# Patient Record
Sex: FEMALE | Race: WHITE | NOT HISPANIC OR LATINO | Employment: FULL TIME | URBAN - METROPOLITAN AREA
[De-identification: names, ages, dates, MRNs, and addresses within clinical notes are randomized per-mention and may not be internally consistent; named-entity substitution may affect disease eponyms.]

---

## 2017-11-09 ENCOUNTER — OFFICE VISIT (OUTPATIENT)
Dept: URGENT CARE | Facility: CLINIC | Age: 22
End: 2017-11-09
Payer: COMMERCIAL

## 2017-11-09 VITALS
RESPIRATION RATE: 16 BRPM | TEMPERATURE: 97.4 F | OXYGEN SATURATION: 98 % | SYSTOLIC BLOOD PRESSURE: 104 MMHG | HEIGHT: 70 IN | BODY MASS INDEX: 24.34 KG/M2 | WEIGHT: 170 LBS | DIASTOLIC BLOOD PRESSURE: 70 MMHG | HEART RATE: 68 BPM

## 2017-11-09 DIAGNOSIS — K29.00 ACUTE SUPERFICIAL GASTRITIS WITHOUT HEMORRHAGE: ICD-10-CM

## 2017-11-09 DIAGNOSIS — K52.9 GASTROENTERITIS: ICD-10-CM

## 2017-11-09 PROCEDURE — 99213 OFFICE O/P EST LOW 20 MIN: CPT | Performed by: NURSE PRACTITIONER

## 2017-11-09 RX ORDER — ONDANSETRON 4 MG/1
4 TABLET, FILM COATED ORAL EVERY 4 HOURS PRN
Qty: 20 TAB | Refills: 0 | Status: SHIPPED | OUTPATIENT
Start: 2017-11-09 | End: 2022-01-20

## 2017-11-09 RX ORDER — ONDANSETRON 4 MG/1
4 TABLET, ORALLY DISINTEGRATING ORAL ONCE
Status: COMPLETED | OUTPATIENT
Start: 2017-11-09 | End: 2017-11-09

## 2017-11-09 RX ADMIN — ONDANSETRON 4 MG: 4 TABLET, ORALLY DISINTEGRATING ORAL at 12:22

## 2017-11-09 ASSESSMENT — ENCOUNTER SYMPTOMS
DIZZINESS: 0
CHILLS: 0
ABDOMINAL PAIN: 1
PALPITATIONS: 0
DIARRHEA: 1
MYALGIAS: 0
SORE THROAT: 0
FEVER: 0
SHORTNESS OF BREATH: 0
VOMITING: 1
COUGH: 0
HEADACHES: 0
NAUSEA: 1

## 2017-11-09 NOTE — LETTER
November 9, 2017       Patient: Lottie Melgoza   YOB: 1995   Date of Visit: 11/9/2017         To Whom It May Concern:    It is my medical opinion that Lottie Melgoza remain out of work until 11/11/2017.    If you have any questions or concerns, please don't hesitate to call [phone]          Sincerely,          SHANE Singh.P.N.  Electronically Signed

## 2017-11-09 NOTE — PROGRESS NOTES
"Subjective:      Lottie Melgoza is a 22 y.o. female who presents with Emesis (x this am, vomiting and diarreha)    Chief Complaint   Patient presents with   • Emesis     x this am, vomiting and diarreha     Started this a.m. She has been having vomiting diarrhea and chills. 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 She did have some lower right abdominal pain but that has resolved after going to the bathroom. She denies any chance of pregnancy and states she is currently on her menstrual cycle            HPI    Review of Systems   Constitutional: Negative for chills, fever and malaise/fatigue.   HENT: Negative for congestion and sore throat.    Respiratory: Negative for cough and shortness of breath.    Cardiovascular: Negative for chest pain and palpitations.   Gastrointestinal: Positive for abdominal pain, diarrhea, nausea and vomiting.   Genitourinary: Negative for dysuria.   Musculoskeletal: Negative for myalgias.   Skin: Negative for rash.   Neurological: Negative for dizziness and headaches.     /  Past Medical History:   Diagnosis Date   • Celiac disease      Family history reviewed and not related to this visit  Social History     Social History   • Marital status: Single     Spouse name: N/A   • Number of children: N/A   • Years of education: N/A     Occupational History   • Not on file.     Social History Main Topics   • Smoking status: Never Smoker   • Smokeless tobacco: Not on file   • Alcohol use Yes      Comment: monthly   • Drug use: No   • Sexual activity: Not on file     Other Topics Concern   • Not on file     Social History Narrative   • No narrative on file          Objective:     /70   Pulse 68   Temp 36.3 °C (97.4 °F)   Resp 16   Ht 1.777 m (5' 9.96\")   Wt 77.1 kg (170 lb)   SpO2 98%   BMI 24.42 kg/m²      Physical Exam   Constitutional: She is oriented to person, place, and time. She appears well-developed and well-nourished. No distress.   HENT:   Head: " Normocephalic.   Cardiovascular: Normal rate.    Pulmonary/Chest: Effort normal.   Abdominal: Soft. She exhibits no distension. Bowel sounds are increased. There is tenderness (generalized,). There is no guarding.   Musculoskeletal: Normal range of motion.   Neurological: She is alert and oriented to person, place, and time.   Skin: Skin is warm and dry.   Psychiatric: She has a normal mood and affect. Her behavior is normal. Judgment and thought content normal.   Nursing note and vitals reviewed.              Assessment/Plan:     1. Acute superficial gastritis without hemorrhage    - ondansetron (ZOFRAN ODT) dispertab 4 mg; Take 1 Tab by mouth Once.    2. Gastroenteritis     - ondansetron (ZOFRAN) 4 MG Tab tablet; Take 1 Tab by mouth every four hours as needed for Nausea/Vomiting.  Dispense: 20 Tab; Refill: 0    Clear liquids  Advance as tolerated  Work note  Patient is aware of when to go to emergency room for treatment of her stomach condition and states verbal understanding of this      Please make an appointment for follow up with your primary care provider or make appointment to establish with a primary care. Return for any perception of change in condition, worsening of symptoms, such as perception of worse pain, worsening fever, not getting better, or any other concerns. Please go to the nearest emergency room for any shortness of breath or chest pain or for any of the emergent conditions we have discussed. Patient and/or family states understanding to plan of care, and discharge instructions. Different diagnosis were discussed and signs/symptoms were discussed to educate on.

## 2018-01-25 ENCOUNTER — OFFICE VISIT (OUTPATIENT)
Dept: URGENT CARE | Facility: CLINIC | Age: 23
End: 2018-01-25
Payer: COMMERCIAL

## 2018-01-25 VITALS
WEIGHT: 165.4 LBS | TEMPERATURE: 98 F | OXYGEN SATURATION: 98 % | RESPIRATION RATE: 16 BRPM | HEART RATE: 84 BPM | BODY MASS INDEX: 23.68 KG/M2 | DIASTOLIC BLOOD PRESSURE: 70 MMHG | SYSTOLIC BLOOD PRESSURE: 110 MMHG | HEIGHT: 70 IN

## 2018-01-25 DIAGNOSIS — J06.9 VIRAL URI WITH COUGH: ICD-10-CM

## 2018-01-25 PROCEDURE — 99214 OFFICE O/P EST MOD 30 MIN: CPT | Performed by: PHYSICIAN ASSISTANT

## 2018-01-25 RX ORDER — CODEINE PHOSPHATE AND GUAIFENESIN 10; 100 MG/5ML; MG/5ML
5 SOLUTION ORAL EVERY 4 HOURS PRN
Qty: 100 ML | Refills: 0 | Status: SHIPPED | OUTPATIENT
Start: 2018-01-25 | End: 2018-01-30

## 2018-01-25 ASSESSMENT — ENCOUNTER SYMPTOMS
COUGH: 1
FEVER: 1
DIZZINESS: 0
HEADACHES: 1
SHORTNESS OF BREATH: 0
SORE THROAT: 1
WHEEZING: 0
SINUS PAIN: 0
CHILLS: 0
CARDIOVASCULAR NEGATIVE: 1
MUSCULOSKELETAL NEGATIVE: 1
MYALGIAS: 0
GASTROINTESTINAL NEGATIVE: 1

## 2018-01-25 ASSESSMENT — PATIENT HEALTH QUESTIONNAIRE - PHQ9: CLINICAL INTERPRETATION OF PHQ2 SCORE: 0

## 2018-01-25 NOTE — LETTER
January 25, 2018         Patient: Lottie Melgoza   YOB: 1995   Date of Visit: 1/25/2018           To Whom it May Concern:    Lottie Melgoza was seen in my clinic on 1/25/2018. She may return to work on Sunday. Jan. 28th.    If you have any questions or concerns, please don't hesitate to call.        Sincerely,           Srinivas Alamo P.A.-C.  Electronically Signed

## 2018-01-25 NOTE — PROGRESS NOTES
Subjective:      Lottie Melgoza is a 22 y.o. female who presents with Cough (fever, bodyaches, sore throat, x 4 days, feeling better today and needs work note )            Cough   This is a new problem. The current episode started in the past 7 days. The problem has been gradually improving. The problem occurs every few minutes. The cough is productive of sputum. Associated symptoms include a fever (resolved), headaches, nasal congestion and a sore throat. Pertinent negatives include no chills, ear pain, myalgias, shortness of breath or wheezing. She has tried OTC cough suppressant for the symptoms. The treatment provided mild relief. There is no history of asthma, bronchitis or pneumonia.       PMH:  has a past medical history of Celiac disease.  MEDS:   Current Outpatient Prescriptions:   •  ondansetron (ZOFRAN) 4 MG Tab tablet, Take 1 Tab by mouth every four hours as needed for Nausea/Vomiting., Disp: 20 Tab, Rfl: 0  •  Hydrocod Polst-CPM Polst ER (TUSSIONEX) 10-8 MG/5ML Suspension Extended Release, Take 5 mL by mouth every 12 hours., Disp: 140 mL, Rfl: 0  •  MethylPREDNISolone (MEDROL DOSEPAK) 4 MG Tablet Therapy Pack, Take as directed., Disp: 21 Tab, Rfl: 0  •  albuterol 108 (90 BASE) MCG/ACT Aero Soln inhalation aerosol, Inhale 2 Puffs by mouth every four hours as needed for Shortness of Breath., Disp: 1 Inhaler, Rfl: 0  •  ondansetron (ZOFRAN ODT) 4 MG TABLET DISPERSIBLE, Take 1 Tab by mouth every 8 hours as needed for Nausea/Vomiting., Disp: 12 Tab, Rfl: 0  ALLERGIES: No Known Allergies  SURGHX: History reviewed. No pertinent surgical history.  SOCHX:  reports that she has never smoked. She has never used smokeless tobacco. She reports that she drinks alcohol. She reports that she does not use drugs.  FH: family history is not on file.      Review of Systems   Constitutional: Positive for fever (resolved). Negative for chills.   HENT: Positive for congestion and sore throat. Negative for ear pain and sinus  "pain.    Respiratory: Positive for cough. Negative for shortness of breath and wheezing.    Cardiovascular: Negative.    Gastrointestinal: Negative.    Musculoskeletal: Negative.  Negative for myalgias.   Neurological: Positive for headaches. Negative for dizziness.       Medications, Allergies, and current problem list reviewed today in Epic     Objective:     /70   Pulse 84   Temp 36.7 °C (98 °F)   Resp 16   Ht 1.777 m (5' 9.96\")   Wt 75 kg (165 lb 6.4 oz)   LMP 01/14/2018   SpO2 98%   Breastfeeding? No   BMI 23.76 kg/m²      Physical Exam   Constitutional: She is oriented to person, place, and time. She appears well-developed and well-nourished. No distress.   HENT:   Head: Normocephalic and atraumatic.   Right Ear: Tympanic membrane and external ear normal.   Left Ear: Tympanic membrane and external ear normal.   Nose: Nose normal.   Mouth/Throat: Oropharynx is clear and moist. No oropharyngeal exudate.   Eyes: Conjunctivae and EOM are normal. Pupils are equal, round, and reactive to light. Right eye exhibits no discharge. Left eye exhibits no discharge.   Neck: Normal range of motion. Neck supple.   Cardiovascular: Normal rate, regular rhythm and normal heart sounds.    Pulmonary/Chest: Effort normal and breath sounds normal. No respiratory distress. She has no wheezes. She has no rales.   Musculoskeletal: Normal range of motion.   Lymphadenopathy:     She has no cervical adenopathy.   Neurological: She is alert and oriented to person, place, and time.   Skin: Skin is warm and dry. She is not diaphoretic.   Psychiatric: She has a normal mood and affect. Her behavior is normal. Judgment and thought content normal.   Nursing note and vitals reviewed.              Assessment/Plan:     1. Viral URI with cough  guaifenesin-codeine (ROBITUSSIN AC) Solution oral solution     PO2 adequate, vital signs normal, exam unremarkable, lungs clear bilaterally. No signs of bacterial infection at this time. Likely " a viral upper respiratory infection. Antibiotics are indicated.  Cough medicine at night   [facility] Aware web site evaluation: I have obtained and reviewed patient utilization report from [facility] database prior to writing prescription for controlled substance II, III or IV. Based on the report and my clinical assessment the prescription is medically necessary.   Patient is cautioned on sedation potential of narcotic medication; no drinking, driving or operating heavy machinery while on this medication.  OTC meds and conservative measures as discussed  Note for work  Return to clinic or go to ED if symptoms worsen or persist. Indications for ED discussed at length. Patient voices understanding. Follow-up with your primary care provider in 3-5 days. Red flags discussed.    Please note that this dictation was created using voice recognition software. I have made every reasonable attempt to correct obvious errors, but I expect that there are errors of grammar and possibly content that I did not discover before finalizing the note.

## 2019-01-05 ENCOUNTER — OFFICE VISIT (OUTPATIENT)
Dept: URGENT CARE | Facility: CLINIC | Age: 24
End: 2019-01-05
Payer: COMMERCIAL

## 2019-01-05 VITALS
OXYGEN SATURATION: 95 % | HEART RATE: 88 BPM | SYSTOLIC BLOOD PRESSURE: 118 MMHG | RESPIRATION RATE: 16 BRPM | BODY MASS INDEX: 25.77 KG/M2 | TEMPERATURE: 98.3 F | WEIGHT: 180 LBS | DIASTOLIC BLOOD PRESSURE: 68 MMHG | HEIGHT: 70 IN

## 2019-01-05 DIAGNOSIS — R05.9 COUGH: ICD-10-CM

## 2019-01-05 DIAGNOSIS — J03.90 TONSILLITIS: ICD-10-CM

## 2019-01-05 DIAGNOSIS — R68.89 FLU-LIKE SYMPTOMS: ICD-10-CM

## 2019-01-05 PROCEDURE — 99214 OFFICE O/P EST MOD 30 MIN: CPT | Performed by: PHYSICIAN ASSISTANT

## 2019-01-05 RX ORDER — AZITHROMYCIN 500 MG/1
500 TABLET, FILM COATED ORAL DAILY
Qty: 7 TAB | Refills: 0 | Status: SHIPPED | OUTPATIENT
Start: 2019-01-05 | End: 2019-01-12

## 2019-01-05 RX ORDER — ONDANSETRON 8 MG/1
4-8 TABLET, ORALLY DISINTEGRATING ORAL 3 TIMES DAILY PRN
Qty: 12 TAB | Refills: 0 | Status: SHIPPED | OUTPATIENT
Start: 2019-01-05 | End: 2022-01-20

## 2019-01-05 RX ORDER — PROMETHAZINE HYDROCHLORIDE AND CODEINE PHOSPHATE 6.25; 1 MG/5ML; MG/5ML
5-10 SYRUP ORAL 3 TIMES DAILY PRN
Qty: 150 ML | Refills: 0 | Status: SHIPPED | OUTPATIENT
Start: 2019-01-05 | End: 2019-01-10

## 2019-01-05 RX ORDER — OSELTAMIVIR PHOSPHATE 75 MG/1
75 CAPSULE ORAL 2 TIMES DAILY
Qty: 10 CAP | Refills: 0 | Status: SHIPPED | OUTPATIENT
Start: 2019-01-05 | End: 2019-01-10

## 2019-01-05 ASSESSMENT — ENCOUNTER SYMPTOMS
FEVER: 0
SHORTNESS OF BREATH: 0
CONSTITUTIONAL NEGATIVE: 1
TROUBLE SWALLOWING: 1
CARDIOVASCULAR NEGATIVE: 1
SORE THROAT: 1
SWOLLEN GLANDS: 1
EYES NEGATIVE: 1
MYALGIAS: 1
WHEEZING: 0
COUGH: 1

## 2019-01-05 NOTE — PROGRESS NOTES
"Subjective:      Lottie Melgoza is a 23 y.o. female who presents with Pharyngitis (sore throat, cough x 2 days)            Pharyngitis    This is a new (cough, st) problem. The current episode started yesterday. The problem has been unchanged. Neither side of throat is experiencing more pain than the other. There has been no fever. The pain is moderate. Associated symptoms include coughing, swollen glands and trouble swallowing. Pertinent negatives include no shortness of breath. She has had no exposure to strep. She has tried nothing for the symptoms. The treatment provided no relief.   Cough   This is a new problem. The current episode started in the past 7 days. The problem has been unchanged. The problem occurs every few minutes. The cough is productive of sputum. Associated symptoms include myalgias and a sore throat. Pertinent negatives include no fever, shortness of breath or wheezing. Nothing aggravates the symptoms. She has tried nothing for the symptoms. The treatment provided no relief. There is no history of asthma.       Review of Systems   Constitutional: Negative.  Negative for fever.   HENT: Positive for sore throat and trouble swallowing.         +phar./tons redn     Eyes: Negative.    Respiratory: Positive for cough. Negative for shortness of breath and wheezing.    Cardiovascular: Negative.    Musculoskeletal: Positive for myalgias.   Skin: Negative.           Objective:     /68 (BP Location: Left arm, Patient Position: Sitting, BP Cuff Size: Adult)   Pulse 88   Temp 36.8 °C (98.3 °F) (Temporal)   Resp 16   Ht 1.777 m (5' 9.96\")   Wt 81.6 kg (180 lb)   SpO2 95%   BMI 25.86 kg/m²      Physical Exam   Constitutional: She is oriented to person, place, and time. She appears well-developed and well-nourished. No distress.   HENT:   Head: Normocephalic and atraumatic.   Mouth/Throat: No oropharyngeal exudate.   +phar./tons redn     Eyes: Pupils are equal, round, and reactive to light. " Conjunctivae and EOM are normal.   Neck: Normal range of motion. Neck supple.   Cardiovascular: Normal rate and regular rhythm.    Pulmonary/Chest: Effort normal and breath sounds normal. No respiratory distress. She has no wheezes. She has no rales.   Lymphadenopathy:     She has cervical adenopathy (+submandib. tend adenopathy).   Neurological: She is alert and oriented to person, place, and time.   Skin: Skin is warm and dry. Capillary refill takes less than 2 seconds.   Psychiatric: She has a normal mood and affect. Her behavior is normal.   Nursing note and vitals reviewed.    Active Ambulatory Problems     Diagnosis Date Noted   • No Active Ambulatory Problems     Resolved Ambulatory Problems     Diagnosis Date Noted   • No Resolved Ambulatory Problems     Past Medical History:   Diagnosis Date   • Celiac disease      Current Outpatient Prescriptions on File Prior to Visit   Medication Sig Dispense Refill   • ondansetron (ZOFRAN) 4 MG Tab tablet Take 1 Tab by mouth every four hours as needed for Nausea/Vomiting. 20 Tab 0   • MethylPREDNISolone (MEDROL DOSEPAK) 4 MG Tablet Therapy Pack Take as directed. 21 Tab 0   • albuterol 108 (90 BASE) MCG/ACT Aero Soln inhalation aerosol Inhale 2 Puffs by mouth every four hours as needed for Shortness of Breath. 1 Inhaler 0   • ondansetron (ZOFRAN ODT) 4 MG TABLET DISPERSIBLE Take 1 Tab by mouth every 8 hours as needed for Nausea/Vomiting. 12 Tab 0     No current facility-administered medications on file prior to visit.      Social History     Social History   • Marital status: Single     Spouse name: N/A   • Number of children: N/A   • Years of education: N/A     Occupational History   • Not on file.     Social History Main Topics   • Smoking status: Never Smoker   • Smokeless tobacco: Never Used   • Alcohol use Yes      Comment: monthly   • Drug use: No   • Sexual activity: Not on file     Other Topics Concern   • Not on file     Social History Narrative   • No narrative  on file     History reviewed. No pertinent family history.  Patient has no known allergies.         rst ng     Assessment/Plan:     ·  ST, cough, r/o flu sx      · rx meds; otc prn

## 2019-01-05 NOTE — LETTER
January 5, 2019         Patient: Lottie Melgoza   YOB: 1995   Date of Visit: 1/5/2019           To Whom it May Concern:    Lottie Melgoza was seen in my clinic on 1/5/2019 for illness; please excuse Saturday thru Monday.    If you have any questions or concerns, please don't hesitate to call.        Sincerely,           Damian Gutierrez P.A.-C.  Electronically Signed

## 2019-04-11 ENCOUNTER — HOSPITAL ENCOUNTER (OUTPATIENT)
Dept: HOSPITAL 8 - RAD | Age: 24
Discharge: HOME | End: 2019-04-11
Attending: NURSE PRACTITIONER
Payer: COMMERCIAL

## 2019-04-11 DIAGNOSIS — R06.02: Primary | ICD-10-CM

## 2019-04-11 PROCEDURE — 71275 CT ANGIOGRAPHY CHEST: CPT

## 2019-06-04 ENCOUNTER — OFFICE VISIT (OUTPATIENT)
Dept: URGENT CARE | Facility: CLINIC | Age: 24
End: 2019-06-04
Payer: COMMERCIAL

## 2019-06-04 VITALS
RESPIRATION RATE: 18 BRPM | WEIGHT: 180 LBS | SYSTOLIC BLOOD PRESSURE: 110 MMHG | BODY MASS INDEX: 28.93 KG/M2 | HEART RATE: 94 BPM | DIASTOLIC BLOOD PRESSURE: 80 MMHG | TEMPERATURE: 97.9 F | HEIGHT: 66 IN | OXYGEN SATURATION: 96 %

## 2019-06-04 DIAGNOSIS — J98.01 BRONCHOSPASM: ICD-10-CM

## 2019-06-04 DIAGNOSIS — J02.9 PHARYNGITIS, UNSPECIFIED ETIOLOGY: ICD-10-CM

## 2019-06-04 DIAGNOSIS — R05.9 COUGH: ICD-10-CM

## 2019-06-04 PROCEDURE — 99214 OFFICE O/P EST MOD 30 MIN: CPT | Performed by: PHYSICIAN ASSISTANT

## 2019-06-04 RX ORDER — NORETHINDRONE 0.35 MG/1
TABLET ORAL
COMMUNITY
Start: 2019-04-27 | End: 2022-01-20

## 2019-06-04 RX ORDER — METHYLPREDNISOLONE 4 MG/1
TABLET ORAL
Qty: 1 KIT | Refills: 0 | Status: SHIPPED | OUTPATIENT
Start: 2019-06-04 | End: 2022-01-20

## 2019-06-04 RX ORDER — ALBUTEROL SULFATE 90 UG/1
2 AEROSOL, METERED RESPIRATORY (INHALATION) EVERY 6 HOURS PRN
Qty: 8.5 G | Refills: 2 | Status: SHIPPED | OUTPATIENT
Start: 2019-06-04

## 2019-06-04 RX ORDER — AZITHROMYCIN 250 MG/1
TABLET, FILM COATED ORAL
Qty: 6 TAB | Refills: 0 | Status: SHIPPED | OUTPATIENT
Start: 2019-06-04 | End: 2019-11-21

## 2019-06-04 ASSESSMENT — ENCOUNTER SYMPTOMS
VOMITING: 0
NAUSEA: 0
ABDOMINAL PAIN: 0
DIARRHEA: 0
WHEEZING: 0
SHORTNESS OF BREATH: 0
HEADACHES: 1
CHILLS: 0
SORE THROAT: 1
FEVER: 1
COUGH: 1

## 2019-06-04 NOTE — PROGRESS NOTES
"Subjective:   Lottie Melgoza is a 23 y.o. female who presents for Cough (x1wee, cough, chest pain, headache, sore throat, fever)        Cough   This is a new problem. The current episode started in the past 7 days. Associated symptoms include a fever, headaches and a sore throat. Pertinent negatives include no chills, rash, shortness of breath or wheezing.     Notes last 4d of ST, cough, fever, cough prod, denies ear pain / sinus congestion, denies nasuea/vomiting/abdpain/diarrhea/rash, denies PMH of asthma, PMH of needing MDI w/ allergies but cannot find, PMH of bronchitis, PMH of pneumonia in 2014, does have seasonal allerg. Takes zyrtec. Tried taking dayquil, zicam.     Review of Systems   Constitutional: Positive for fever. Negative for chills.   HENT: Positive for sore throat.    Respiratory: Positive for cough. Negative for shortness of breath and wheezing.    Gastrointestinal: Negative for abdominal pain, diarrhea, nausea and vomiting.   Skin: Negative for rash.   Neurological: Positive for headaches.     No Known Allergies   I have worn a mask for the entire encounter with this patient.    Objective:   /80 (BP Location: Left arm, Patient Position: Sitting, BP Cuff Size: Adult)   Pulse 94   Temp 36.6 °C (97.9 °F) (Temporal)   Resp 18   Ht 1.676 m (5' 6\")   Wt 81.6 kg (180 lb)   SpO2 96%   BMI 29.05 kg/m²   Physical Exam   Constitutional: She is oriented to person, place, and time. She appears well-developed and well-nourished. No distress.   HENT:   Head: Normocephalic and atraumatic.   Right Ear: Tympanic membrane, external ear and ear canal normal.   Left Ear: Tympanic membrane, external ear and ear canal normal.   Nose: Nose normal.   Mouth/Throat: Uvula is midline and mucous membranes are normal. Posterior oropharyngeal erythema ( mild PND) present. No oropharyngeal exudate, posterior oropharyngeal edema or tonsillar abscesses.   Eyes: Conjunctivae and lids are normal. Right eye exhibits no " discharge. Left eye exhibits no discharge. No scleral icterus.   Neck: Neck supple.   Pulmonary/Chest: Effort normal. No respiratory distress. She has no decreased breath sounds. She has no wheezes. She has no rhonchi. She has no rales.   Musculoskeletal: Normal range of motion.   Lymphadenopathy:     She has cervical adenopathy ( mild bilat).   Neurological: She is alert and oriented to person, place, and time. She is not disoriented.   Skin: Skin is warm and dry. She is not diaphoretic. No erythema. No pallor.   Psychiatric: Her speech is normal and behavior is normal.   Nursing note and vitals reviewed.        Assessment/Plan:   1. Pharyngitis, unspecified etiology  - lidocaine viscous 2% (XYLOCAINE) 2 % Solution; Take 5 mL by mouth as needed for Throat/Mouth Pain (q6hr PRN throat pain, ok to rinse and spit or swallow).  Dispense: 120 mL; Refill: 0    2. Cough  - azithromycin (ZITHROMAX) 250 MG Tab; Take as directed on package. Dispense one package.  Dispense: 6 Tab; Refill: 0    3. Bronchospasm  - albuterol 108 (90 Base) MCG/ACT Aero Soln inhalation aerosol; Inhale 2 Puffs by mouth every 6 hours as needed for Shortness of Breath.  Dispense: 8.5 g; Refill: 2  - MethylPREDNISolone (MEDROL DOSEPAK) 4 MG Tablet Therapy Pack; Take as directed on package.  Dispense one package.  Dispense: 1 Kit; Refill: 0    Other orders  - RUBY 0.35 MG tablet;   Supportive care is reviewed with patient/caregiver - recommend to push PO fluids and electrolytes, Nsaids/tylenol, netti pot/saline irrig, humidifier in home, flonase, ponaris, antihistamine, some bronchospasm, will tx now w/ steroid, Cautioned regarding potential side effects of steroid, avoid nsaids while using  MDI, Contingent antibiotic prescription given to patient to fill upon meeting criteria of guidelines discussed.   If filling,  take full course of Rx, take with probiotics, observe for resolution  Return to clinic with lack of resolution or progression of  symptoms.    Sent w/ work note  Differential diagnosis, natural history, supportive care, and indications for immediate follow-up discussed.

## 2019-06-04 NOTE — LETTER
June 4, 2019       Patient: Lottie Melgoza   YOB: 1995   Date of Visit: 6/4/2019         To Whom It May Concern:    It is my medical opinion that Lottie Melgoza was seen in my clinic. She is permitted to return to work as tolerated by symptoms.         If you have any questions or concerns, please don't hesitate to call [phone]          Sincerely,          Richardson Nassar P.A.-C.  Electronically Signed

## 2019-11-21 ENCOUNTER — OFFICE VISIT (OUTPATIENT)
Dept: URGENT CARE | Facility: CLINIC | Age: 24
End: 2019-11-21
Payer: COMMERCIAL

## 2019-11-21 VITALS
BODY MASS INDEX: 26.05 KG/M2 | OXYGEN SATURATION: 95 % | TEMPERATURE: 98.3 F | HEART RATE: 96 BPM | RESPIRATION RATE: 20 BRPM | DIASTOLIC BLOOD PRESSURE: 64 MMHG | HEIGHT: 70 IN | SYSTOLIC BLOOD PRESSURE: 104 MMHG | WEIGHT: 182 LBS

## 2019-11-21 DIAGNOSIS — J02.9 PHARYNGITIS, UNSPECIFIED ETIOLOGY: ICD-10-CM

## 2019-11-21 LAB
FLUAV+FLUBV AG SPEC QL IA: NEGATIVE
INT CON NEG: NORMAL
INT CON NEG: NORMAL
INT CON POS: NORMAL
INT CON POS: NORMAL
S PYO AG THROAT QL: NEGATIVE

## 2019-11-21 PROCEDURE — 87804 INFLUENZA ASSAY W/OPTIC: CPT | Performed by: FAMILY MEDICINE

## 2019-11-21 PROCEDURE — 99214 OFFICE O/P EST MOD 30 MIN: CPT | Performed by: FAMILY MEDICINE

## 2019-11-21 PROCEDURE — 87880 STREP A ASSAY W/OPTIC: CPT | Performed by: FAMILY MEDICINE

## 2019-11-21 RX ORDER — BENZONATATE 100 MG/1
100 CAPSULE ORAL 3 TIMES DAILY PRN
Qty: 60 CAP | Refills: 0 | Status: SHIPPED | OUTPATIENT
Start: 2019-11-21 | End: 2022-01-20

## 2019-11-21 RX ORDER — AZITHROMYCIN 250 MG/1
TABLET, FILM COATED ORAL
Qty: 6 TAB | Refills: 0 | Status: SHIPPED | OUTPATIENT
Start: 2019-11-21 | End: 2022-01-20

## 2019-11-21 ASSESSMENT — PATIENT HEALTH QUESTIONNAIRE - PHQ9: CLINICAL INTERPRETATION OF PHQ2 SCORE: 0

## 2019-11-21 ASSESSMENT — ENCOUNTER SYMPTOMS
NAUSEA: 0
FEVER: 1
MYALGIAS: 1
COUGH: 1
VOMITING: 0
ROS GI COMMENTS: 1
HOARSE VOICE: 1
CHILLS: 1

## 2019-11-21 NOTE — PROGRESS NOTES
"Subjective:   Lottie Melgoza  is a 24 y.o. female who presents for Pharyngitis (cough, chill, vomitted from the cough, hard to swallow, no appetite x 1 week) and GI Problem (diarrhea , comes and goes x 1 week)        Pharyngitis    This is a new problem. The current episode started in the past 7 days. The problem has been rapidly worsening. Neither side of throat is experiencing more pain than the other. There has been no fever. The pain is severe. Associated symptoms include congestion, coughing and a hoarse voice. Pertinent negatives include no vomiting.   GI Problem   This is a new problem. The current episode started in the past 7 days. The problem occurs constantly. The problem has been waxing and waning. Associated symptoms include chills, congestion, coughing, a fever and myalgias. Pertinent negatives include no nausea or vomiting.     Review of Systems   Constitutional: Positive for chills and fever.   HENT: Positive for congestion and hoarse voice.    Respiratory: Positive for cough.    Gastrointestinal: Negative for nausea and vomiting.        1   Musculoskeletal: Positive for myalgias.     No Known Allergies   Objective:   /64 (BP Location: Right arm, Patient Position: Sitting, BP Cuff Size: Adult)   Pulse 96   Temp 36.8 °C (98.3 °F) (Temporal)   Resp 20   Ht 1.778 m (5' 10\")   Wt 82.6 kg (182 lb)   SpO2 95%   BMI 26.11 kg/m²   Physical Exam  Constitutional:       General: She is not in acute distress.     Appearance: She is well-developed.   HENT:      Head: Normocephalic and atraumatic.      Nose: Mucosal edema and rhinorrhea present.      Mouth/Throat:      Pharynx: Uvula midline. Posterior oropharyngeal erythema present.      Tonsils: No tonsillar abscesses.   Eyes:      Conjunctiva/sclera: Conjunctivae normal.      Pupils: Pupils are equal, round, and reactive to light.   Cardiovascular:      Rate and Rhythm: Normal rate and regular rhythm.      Heart sounds: No murmur.   Pulmonary:      " Effort: Pulmonary effort is normal. No respiratory distress.      Breath sounds: Normal breath sounds.   Abdominal:      General: There is no distension.      Palpations: Abdomen is soft.      Tenderness: There is no tenderness.   Skin:     General: Skin is warm and dry.   Neurological:      Mental Status: She is alert and oriented to person, place, and time.      Sensory: No sensory deficit.      Deep Tendon Reflexes: Reflexes are normal and symmetric.           Assessment/Plan:   1. Pharyngitis, unspecified etiology  - POCT Rapid Strep A  - POCT Influenza A/B  - azithromycin (ZITHROMAX) 250 MG Tab; Take 2 tablets by mouth on day one. Take one tablet by mouth the remaining days until gone  Dispense: 6 Tab; Refill: 0  - benzonatate (TESSALON) 100 MG Cap; Take 1 Cap by mouth 3 times a day as needed for Cough.  Dispense: 60 Cap; Refill: 0    Other orders  - Pseudoephedrine-APAP-DM -15 MG Cap; Take  by mouth.  - norgestrel-ethinyl estradiol (LO-OVRAL) 0.3-30 MG-MCG Tab; Take 1 Tab by mouth every day.    Differential diagnosis, natural history, supportive care, and indications for immediate follow-up discussed.

## 2019-11-21 NOTE — LETTER
November 21, 2019         Patient: Lottie Melgoza   YOB: 1995   Date of Visit: 11/21/2019           To Whom it May Concern:    Lottie Melgoza was seen in my clinic on 11/21/2019. She may return to work on 11/24/2019..    If you have any questions or concerns, please don't hesitate to call.        Sincerely,           Audi Raymundo M.D.  Electronically Signed

## 2022-01-20 ENCOUNTER — HOSPITAL ENCOUNTER (OUTPATIENT)
Facility: MEDICAL CENTER | Age: 27
End: 2022-01-20
Attending: PHYSICIAN ASSISTANT
Payer: COMMERCIAL

## 2022-01-20 ENCOUNTER — OFFICE VISIT (OUTPATIENT)
Dept: URGENT CARE | Facility: CLINIC | Age: 27
End: 2022-01-20
Payer: COMMERCIAL

## 2022-01-20 VITALS
BODY MASS INDEX: 28.63 KG/M2 | TEMPERATURE: 98.1 F | SYSTOLIC BLOOD PRESSURE: 108 MMHG | HEART RATE: 107 BPM | WEIGHT: 200 LBS | OXYGEN SATURATION: 96 % | HEIGHT: 70 IN | DIASTOLIC BLOOD PRESSURE: 68 MMHG | RESPIRATION RATE: 18 BRPM

## 2022-01-20 DIAGNOSIS — Z20.822 EXPOSURE TO COVID-19 VIRUS: ICD-10-CM

## 2022-01-20 DIAGNOSIS — R11.2 NON-INTRACTABLE VOMITING WITH NAUSEA, UNSPECIFIED VOMITING TYPE: ICD-10-CM

## 2022-01-20 DIAGNOSIS — Z20.822 SUSPECTED COVID-19 VIRUS INFECTION: ICD-10-CM

## 2022-01-20 LAB — COVID ORDER STATUS COVID19: NORMAL

## 2022-01-20 PROCEDURE — 99213 OFFICE O/P EST LOW 20 MIN: CPT | Mod: CS | Performed by: PHYSICIAN ASSISTANT

## 2022-01-20 PROCEDURE — U0003 INFECTIOUS AGENT DETECTION BY NUCLEIC ACID (DNA OR RNA); SEVERE ACUTE RESPIRATORY SYNDROME CORONAVIRUS 2 (SARS-COV-2) (CORONAVIRUS DISEASE [COVID-19]), AMPLIFIED PROBE TECHNIQUE, MAKING USE OF HIGH THROUGHPUT TECHNOLOGIES AS DESCRIBED BY CMS-2020-01-R: HCPCS

## 2022-01-20 PROCEDURE — U0005 INFEC AGEN DETEC AMPLI PROBE: HCPCS

## 2022-01-20 RX ORDER — CITALOPRAM 20 MG/1
TABLET ORAL
COMMUNITY
Start: 2021-12-25

## 2022-01-20 RX ORDER — ONDANSETRON 2 MG/ML
4 INJECTION INTRAMUSCULAR; INTRAVENOUS ONCE
Status: COMPLETED | OUTPATIENT
Start: 2022-01-20 | End: 2022-01-20

## 2022-01-20 RX ORDER — ACETAMINOPHEN AND CODEINE PHOSPHATE 120; 12 MG/5ML; MG/5ML
0.35 SOLUTION ORAL DAILY
COMMUNITY
End: 2022-10-04

## 2022-01-20 RX ORDER — ONDANSETRON 4 MG/1
4 TABLET, FILM COATED ORAL EVERY 4 HOURS PRN
Qty: 20 TABLET | Refills: 0 | Status: SHIPPED | OUTPATIENT
Start: 2022-01-20 | End: 2022-10-04

## 2022-01-20 RX ADMIN — ONDANSETRON 4 MG: 2 INJECTION INTRAMUSCULAR; INTRAVENOUS at 09:15

## 2022-01-20 ASSESSMENT — ENCOUNTER SYMPTOMS
COUGH: 1
ABDOMINAL PAIN: 0
VOMITING: 1
NAUSEA: 1
SHORTNESS OF BREATH: 1
DIARRHEA: 0
RHINORRHEA: 1
HEADACHES: 1
EYE PAIN: 0
PALPITATIONS: 0
SINUS PAIN: 0
FEVER: 1
MYALGIAS: 1
BLURRED VISION: 0
SORE THROAT: 1
CHILLS: 1
DIZZINESS: 0

## 2022-01-20 NOTE — PROGRESS NOTES
Subjective     Lottie Melgoza is a 26 y.o. female who presents with URI (HA, ST, EMESIS, BODY ACHES, SOB)    URI   This is a new problem. Episode onset: Symptoms started 2 days ago. The problem has been unchanged. The maximum temperature recorded prior to her arrival was 100.4 - 100.9 F. The fever has been present for 1 to 2 days. Associated symptoms include congestion, coughing, headaches, joint pain, nausea, rhinorrhea, a sore throat and vomiting. Pertinent negatives include no abdominal pain, chest pain, diarrhea, ear pain, rash or sinus pain. Treatments tried: DayQuil, vitamin C, vitamin D, zinc. The treatment provided mild relief.   Patient reports that her roommate's boyfriend recently tested positive for COVID-19 virus.  Patient is not vaccinated for COVID-19 virus.    Review of Systems   Constitutional: Positive for chills, fever and malaise/fatigue.   HENT: Positive for congestion, rhinorrhea and sore throat. Negative for ear pain and sinus pain.    Eyes: Negative for blurred vision and pain.   Respiratory: Positive for cough and shortness of breath.    Cardiovascular: Negative for chest pain and palpitations.   Gastrointestinal: Positive for nausea and vomiting. Negative for abdominal pain and diarrhea.   Musculoskeletal: Positive for joint pain and myalgias.   Skin: Negative for rash.   Neurological: Positive for headaches. Negative for dizziness.         PMH:  has a past medical history of Celiac disease.  MEDS:   Current Outpatient Medications:   •  citalopram (CELEXA) 20 MG Tab, , Disp: , Rfl:   •  norethindrone (MICRONOR) 0.35 MG tablet, Take 0.35 mg by mouth every day., Disp: , Rfl:   •  albuterol 108 (90 Base) MCG/ACT Aero Soln inhalation aerosol, Inhale 2 Puffs by mouth every 6 hours as needed for Shortness of Breath., Disp: 8.5 g, Rfl: 2  •  albuterol 108 (90 BASE) MCG/ACT Aero Soln inhalation aerosol, Inhale 2 Puffs by mouth every four hours as needed for Shortness of Breath., Disp: 1 Inhaler,  "Rfl: 0  •  azithromycin (ZITHROMAX) 250 MG Tab, Take 2 tablets by mouth on day one. Take one tablet by mouth the remaining days until gone, Disp: 6 Tab, Rfl: 0  •  benzonatate (TESSALON) 100 MG Cap, Take 1 Cap by mouth 3 times a day as needed for Cough., Disp: 60 Cap, Rfl: 0  •  MethylPREDNISolone (MEDROL DOSEPAK) 4 MG Tablet Therapy Pack, Take as directed on package.  Dispense one package. (Patient not taking: Reported on 11/21/2019), Disp: 1 Kit, Rfl: 0  •  lidocaine viscous 2% (XYLOCAINE) 2 % Solution, Take 5 mL by mouth as needed for Throat/Mouth Pain (q6hr PRN throat pain, ok to rinse and spit or swallow). (Patient not taking: Reported on 11/21/2019), Disp: 120 mL, Rfl: 0  •  ondansetron (ZOFRAN ODT) 8 MG TABLET DISPERSIBLE, Take 0.5-1 Tabs by mouth 3 times a day as needed. (Patient not taking: Reported on 6/4/2019), Disp: 12 Tab, Rfl: 0  •  ondansetron (ZOFRAN) 4 MG Tab tablet, Take 1 Tab by mouth every four hours as needed for Nausea/Vomiting. (Patient not taking: Reported on 6/4/2019), Disp: 20 Tab, Rfl: 0  •  MethylPREDNISolone (MEDROL DOSEPAK) 4 MG Tablet Therapy Pack, Take as directed. (Patient not taking: Reported on 6/4/2019), Disp: 21 Tab, Rfl: 0  •  ondansetron (ZOFRAN ODT) 4 MG TABLET DISPERSIBLE, Take 1 Tab by mouth every 8 hours as needed for Nausea/Vomiting. (Patient not taking: Reported on 6/4/2019), Disp: 12 Tab, Rfl: 0  ALLERGIES:   Allergies   Allergen Reactions   • Gluten Meal Vomiting and Nausea     SURGHX:   Past Surgical History:   Procedure Laterality Date   • KNEE ARTHROSCOPY       SOCHX:  reports that she has never smoked. She has never used smokeless tobacco. She reports current alcohol use. She reports that she does not use drugs.  FH: Family history was reviewed, no pertinent findings to report      Objective     /68   Pulse (!) 107   Temp 36.7 °C (98.1 °F) (Temporal)   Resp 18   Ht 1.778 m (5' 10\")   Wt 90.7 kg (200 lb)   LMP 01/20/2022 (Exact Date)   SpO2 96%   " Breastfeeding No   BMI 28.70 kg/m²      Physical Exam  Constitutional:       Appearance: She is well-developed.   HENT:      Head: Normocephalic and atraumatic.      Right Ear: Tympanic membrane, ear canal and external ear normal.      Left Ear: Tympanic membrane, ear canal and external ear normal.      Nose: Mucosal edema, congestion and rhinorrhea present. Rhinorrhea is clear.      Mouth/Throat:      Lips: Pink.      Mouth: Mucous membranes are moist.      Pharynx: Uvula midline. Posterior oropharyngeal erythema present. No uvula swelling.      Tonsils: No tonsillar exudate or tonsillar abscesses.   Eyes:      Conjunctiva/sclera: Conjunctivae normal.      Pupils: Pupils are equal, round, and reactive to light.   Cardiovascular:      Rate and Rhythm: Normal rate and regular rhythm.      Heart sounds: Normal heart sounds. No murmur heard.      Pulmonary:      Effort: Pulmonary effort is normal.      Breath sounds: Normal breath sounds. No decreased breath sounds, wheezing, rhonchi or rales.   Musculoskeletal:      Cervical back: Normal range of motion.   Lymphadenopathy:      Cervical: No cervical adenopathy.   Skin:     General: Skin is warm and dry.      Capillary Refill: Capillary refill takes less than 2 seconds.   Neurological:      Mental Status: She is alert and oriented to person, place, and time.   Psychiatric:         Behavior: Behavior normal.         Judgment: Judgment normal.         POCT SARS-COV Antigen BRENNAN Manual Result - Negative    Assessment & Plan     1. Exposure to COVID-19 virus  - POCT SARS-COV Antigen BRENNAN Manual Result  - COVID/SARS CoV-2 PCR; Future    2. Suspected COVID-19 virus infection  - POCT SARS-COV Antigen BRENNAN Manual Result  - COVID/SARS CoV-2 PCR; Future  - OTC cold/flu medications  - PO fluids  - Rest  - Tylenol or ibuprofen as needed for fever > 100.4 F    3. Non-intractable vomiting with nausea, unspecified vomiting type  - POCT SARS-COV Antigen BRENNAN Manual Result  - ondansetron  (ZOFRAN) syringe/vial injection 4 mg  - ondansetron (ZOFRAN) 4 MG Tab tablet; Take 1 Tablet by mouth every four hours as needed for Nausea/Vomiting.  Dispense: 20 Tablet; Refill: 0  - COVID/SARS CoV-2 PCR; Future    *Patient had a nasal swab to test for COVID-19 virus.  Patient was advised to stay home and self isolate/self quarantine while awaiting the results.  Supportive care was reiterated.  Return/ER precautions discussed.         Differential Diagnosis, natural history, and supportive care discussed. Return to the Urgent Care or follow up with your PCP if symptoms fail to resolve, or for any new or worsening symptoms. Emergency room precautions discussed. Patient and/or family appears understanding of information.

## 2022-01-21 LAB
SARS-COV-2 RNA RESP QL NAA+PROBE: DETECTED
SPECIMEN SOURCE: ABNORMAL

## 2022-10-04 ENCOUNTER — OFFICE VISIT (OUTPATIENT)
Dept: URGENT CARE | Facility: PHYSICIAN GROUP | Age: 27
End: 2022-10-04
Payer: COMMERCIAL

## 2022-10-04 VITALS
RESPIRATION RATE: 16 BRPM | SYSTOLIC BLOOD PRESSURE: 100 MMHG | DIASTOLIC BLOOD PRESSURE: 66 MMHG | HEIGHT: 70 IN | TEMPERATURE: 96.7 F | WEIGHT: 220 LBS | BODY MASS INDEX: 31.5 KG/M2 | OXYGEN SATURATION: 96 % | HEART RATE: 78 BPM

## 2022-10-04 DIAGNOSIS — R11.2 NAUSEA AND VOMITING, UNSPECIFIED VOMITING TYPE: ICD-10-CM

## 2022-10-04 DIAGNOSIS — A08.4 VIRAL GASTROENTERITIS: ICD-10-CM

## 2022-10-04 LAB
FLUAV+FLUBV AG SPEC QL IA: NEGATIVE
INT CON NEG: NEGATIVE
INT CON POS: POSITIVE

## 2022-10-04 PROCEDURE — 99213 OFFICE O/P EST LOW 20 MIN: CPT | Performed by: STUDENT IN AN ORGANIZED HEALTH CARE EDUCATION/TRAINING PROGRAM

## 2022-10-04 PROCEDURE — 87804 INFLUENZA ASSAY W/OPTIC: CPT | Performed by: STUDENT IN AN ORGANIZED HEALTH CARE EDUCATION/TRAINING PROGRAM

## 2022-10-04 RX ORDER — ONDANSETRON 4 MG/1
4 TABLET, FILM COATED ORAL EVERY 4 HOURS PRN
Qty: 20 TABLET | Refills: 0 | Status: SHIPPED | OUTPATIENT
Start: 2022-10-04

## 2022-10-04 RX ORDER — ONDANSETRON 4 MG/1
4 TABLET, ORALLY DISINTEGRATING ORAL ONCE
Status: COMPLETED | OUTPATIENT
Start: 2022-10-04 | End: 2022-10-04

## 2022-10-04 RX ADMIN — ONDANSETRON 4 MG: 4 TABLET, ORALLY DISINTEGRATING ORAL at 12:15

## 2022-10-04 NOTE — PROGRESS NOTES
"Subjective:   Lottie Melgoza is a 26 y.o. female who presents for Nausea (Onset 1 day), Emesis (Onset 1 day), and Chills (Onset 1 day/)      HPI:  Pleasant 26-year-old female with history of celiac disease presents to the urgent care for 1 day of nausea with vomiting, chills, and watery diarrhea.  No recent sick contacts or recent travel.  She denies any known food exposures that would cause her symptoms.  She denies any gluten in her diet.  She reports that she has a very strict diet due to her celiac disease.  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  She denies known fever, diaphoresis, rash, ear pain, ear discharge, sore throat, chest pain, palpitations, lower leg swelling, lower leg pain, cough, shortness of breath, abdominal pain, constipation, blood in stool, melena, projectile vomiting, bilious vomiting, dysuria, dizziness, or headache.  Last episode of vomiting was at 9 AM this morning.        Medications:    albuterol Aers  citalopram Tabs  ondansetron Tabs    Allergies: Gluten meal    Problem List: Lottie Melgoza does not have a problem list on file.    Surgical History:  Past Surgical History:   Procedure Laterality Date    KNEE ARTHROSCOPY         Past Social Hx: Lottie Melgoza  reports that she has never smoked. She has never used smokeless tobacco. She reports current alcohol use. She reports that she does not use drugs.     Past Family Hx:  Lottie Melgoza family history is not on file.     Problem list, medications, and allergies reviewed by myself today in Epic.     Objective:     /66 (BP Location: Left arm, Patient Position: Sitting, BP Cuff Size: Adult)   Pulse 78   Temp 35.9 °C (96.7 °F) (Temporal)   Resp 16   Ht 1.778 m (5' 10\")   Wt 99.8 kg (220 lb)   SpO2 96%   BMI 31.57 kg/m²     Physical Exam  Vitals reviewed.   Constitutional:       General: She is not in acute distress.     Appearance: Normal appearance.   HENT:      Nose: " Nose normal.      Mouth/Throat:      Mouth: Mucous membranes are moist.      Pharynx: No oropharyngeal exudate or posterior oropharyngeal erythema.   Eyes:      Conjunctiva/sclera: Conjunctivae normal.      Pupils: Pupils are equal, round, and reactive to light.   Cardiovascular:      Rate and Rhythm: Normal rate and regular rhythm.      Pulses: Normal pulses.      Heart sounds: Normal heart sounds. No murmur heard.  Pulmonary:      Effort: Pulmonary effort is normal. No respiratory distress.      Breath sounds: Normal breath sounds. No stridor. No wheezing, rhonchi or rales.   Abdominal:      General: Abdomen is flat. Bowel sounds are normal. There is no distension.      Palpations: Abdomen is soft.      Tenderness: There is no abdominal tenderness. There is no right CVA tenderness, left CVA tenderness, guarding or rebound.      Hernia: No hernia is present.   Musculoskeletal:      Cervical back: Normal range of motion.   Lymphadenopathy:      Cervical: No cervical adenopathy.   Skin:     General: Skin is warm and dry.      Capillary Refill: Capillary refill takes less than 2 seconds.      Findings: No erythema, lesion or rash.   Neurological:      General: No focal deficit present.      Mental Status: She is alert and oriented to person, place, and time.       Lab Results/POC Test Results   Results for orders placed or performed in visit on 10/04/22   POCT Influenza A/B   Result Value Ref Range    Rapid Influenza A-B Negative     Internal Control Positive Positive     Internal Control Negative Negative            Assessment/Plan:     Diagnosis and associated orders:     1. Nausea and vomiting, unspecified vomiting type  POCT Influenza A/B    ondansetron (ZOFRAN ODT) dispertab 4 mg    ondansetron (ZOFRAN) 4 MG Tab tablet      2. Viral gastroenteritis           Comments/MDM:     POCT influenza A/B-.  Patient does not want any COVID 19 testing at this time.  Patient's presentation physical exam findings are most  consistent with a viral gastroenteritis.  She is not displaying any signs of dehydration on exam.  She is stable this time and her vitals are all within normal limits.   She was given 4 mg Zofran ODT in clinic with improvement in her nausea.  She was also given prescription of Zofran to have at home and may use this as needed in order to maintain adequate oral intake of fluids.  Discussed with patient that most important thing is aggressive fluid rehydration at this time.  Abdominal exam shows no abnormal findings or acute abdominal process.  No guarding or rigidity.  No red flag signs or blood in her stool.  I do not suspect bacterial process at this time.  Strict ED/return precautions were given.  Patient has good understanding the signs and symptoms that warrant immediate reevaluation.         Differential diagnosis, natural history, supportive care, and indications for immediate follow-up discussed.    Advised the patient to follow-up with the primary care physician for recheck, reevaluation, and consideration of further management.    Please note that this dictation was created using voice recognition software. I have made a reasonable attempt to correct obvious errors, but I expect that there are errors of grammar and possibly content that I did not discover before finalizing the note.    Electronically signed by Rigo Segovia PA-C.

## 2022-10-04 NOTE — LETTER
[facility] [address] [address]     October 4, 2022    Patient: Lottie Melgoza   YOB: 1995   Date of Visit: 10/4/2022       To Whom It May Concern:    Lottie Melgoza was seen and treated in our department on 10/4/2022.  Patient is excuse from work on 10/4/2022 through 10/5/2022.    Sincerely,     Rigo Segovia P.A.-C.

## 2023-11-14 ENCOUNTER — EMPLOYEE HEALTH (OUTPATIENT)
Dept: OCCUPATIONAL MEDICINE | Facility: CLINIC | Age: 28
End: 2023-11-14

## 2023-11-14 ENCOUNTER — HOSPITAL ENCOUNTER (OUTPATIENT)
Facility: MEDICAL CENTER | Age: 28
End: 2023-11-14
Attending: NURSE PRACTITIONER
Payer: COMMERCIAL

## 2023-11-14 ENCOUNTER — EH NON-PROVIDER (OUTPATIENT)
Dept: OCCUPATIONAL MEDICINE | Facility: CLINIC | Age: 28
End: 2023-11-14

## 2023-11-14 VITALS
WEIGHT: 214.8 LBS | TEMPERATURE: 98.3 F | HEART RATE: 80 BPM | DIASTOLIC BLOOD PRESSURE: 78 MMHG | BODY MASS INDEX: 30.07 KG/M2 | HEIGHT: 71 IN | OXYGEN SATURATION: 99 % | SYSTOLIC BLOOD PRESSURE: 108 MMHG | RESPIRATION RATE: 20 BRPM

## 2023-11-14 DIAGNOSIS — Z02.1 PRE-EMPLOYMENT DRUG SCREENING: ICD-10-CM

## 2023-11-14 DIAGNOSIS — Z02.1 PRE-EMPLOYMENT HEALTH SCREENING EXAMINATION: ICD-10-CM

## 2023-11-14 LAB
AMP AMPHETAMINE: NEGATIVE
BAR BARBITURATES: NEGATIVE
BZO BENZODIAZEPINES: NEGATIVE
COC COCAINE: NEGATIVE
INT CON NEG: NORMAL
INT CON POS: NORMAL
MDMA ECSTASY: NEGATIVE
MET METHAMPHETAMINES: NEGATIVE
MTD METHADONE: NEGATIVE
OPI OPIATES: NEGATIVE
OXY OXYCODONE: NEGATIVE
PCP PHENCYCLIDINE: NEGATIVE
POC URINE DRUG SCREEN OCDRS: NEGATIVE
THC: NEGATIVE

## 2023-11-14 PROCEDURE — 80305 DRUG TEST PRSMV DIR OPT OBS: CPT | Performed by: NURSE PRACTITIONER

## 2023-11-14 PROCEDURE — 94375 RESPIRATORY FLOW VOLUME LOOP: CPT | Performed by: NURSE PRACTITIONER

## 2023-11-14 PROCEDURE — 86480 TB TEST CELL IMMUN MEASURE: CPT | Performed by: NURSE PRACTITIONER

## 2023-11-14 PROCEDURE — 8915 PR COMPREHENSIVE PHYSICAL: Performed by: NURSE PRACTITIONER

## 2023-11-14 ASSESSMENT — VISUAL ACUITY
OD_CC: 20/13
OS_CC: 20/15

## 2023-11-14 NOTE — PROGRESS NOTES
Immunizations  1) Administered: None  2) Verified 2MMR,2Varicella,3 Hep B, Current Tdap  3) Declined Flu, Covid    Labs:  1) Quantiferon  2) POCT instant 11    Tests:  1) Vision  2) Mask fit

## 2023-11-15 LAB
GAMMA INTERFERON BACKGROUND BLD IA-ACNC: 0.05 IU/ML
M TB IFN-G BLD-IMP: NEGATIVE
M TB IFN-G CD4+ BCKGRND COR BLD-ACNC: -0.01 IU/ML
MITOGEN IGNF BCKGRD COR BLD-ACNC: >10 IU/ML
QFT TB2 - NIL TBQ2: 0 IU/ML

## 2024-08-06 ENCOUNTER — OFFICE VISIT (OUTPATIENT)
Dept: MEDICAL GROUP | Facility: MEDICAL CENTER | Age: 29
End: 2024-08-06
Payer: COMMERCIAL

## 2024-08-06 VITALS
HEART RATE: 83 BPM | TEMPERATURE: 97.1 F | SYSTOLIC BLOOD PRESSURE: 104 MMHG | BODY MASS INDEX: 28.94 KG/M2 | HEIGHT: 70 IN | OXYGEN SATURATION: 98 % | WEIGHT: 202.16 LBS | DIASTOLIC BLOOD PRESSURE: 70 MMHG

## 2024-08-06 DIAGNOSIS — K90.0 CELIAC DISEASE: ICD-10-CM

## 2024-08-06 DIAGNOSIS — Z11.3 ROUTINE SCREENING FOR STI (SEXUALLY TRANSMITTED INFECTION): ICD-10-CM

## 2024-08-06 DIAGNOSIS — J45.909 MILD REACTIVE AIRWAYS DISEASE, UNSPECIFIED WHETHER PERSISTENT: ICD-10-CM

## 2024-08-06 DIAGNOSIS — F41.9 ANXIETY: ICD-10-CM

## 2024-08-06 DIAGNOSIS — F33.41 RECURRENT MAJOR DEPRESSIVE DISORDER, IN PARTIAL REMISSION (HCC): ICD-10-CM

## 2024-08-06 PROBLEM — T78.2XXA ANAPHYLAXIS: Status: ACTIVE | Noted: 2024-08-06

## 2024-08-06 PROBLEM — F32.9 MDD (MAJOR DEPRESSIVE DISORDER): Status: ACTIVE | Noted: 2024-08-06

## 2024-08-06 PROCEDURE — 3074F SYST BP LT 130 MM HG: CPT | Performed by: STUDENT IN AN ORGANIZED HEALTH CARE EDUCATION/TRAINING PROGRAM

## 2024-08-06 PROCEDURE — 3078F DIAST BP <80 MM HG: CPT | Performed by: STUDENT IN AN ORGANIZED HEALTH CARE EDUCATION/TRAINING PROGRAM

## 2024-08-06 PROCEDURE — 99214 OFFICE O/P EST MOD 30 MIN: CPT | Performed by: STUDENT IN AN ORGANIZED HEALTH CARE EDUCATION/TRAINING PROGRAM

## 2024-08-06 RX ORDER — CITALOPRAM HYDROBROMIDE 20 MG/1
20 TABLET ORAL DAILY
Qty: 90 TABLET | Refills: 3 | Status: SHIPPED | OUTPATIENT
Start: 2024-08-06 | End: 2024-08-06

## 2024-08-06 RX ORDER — CITALOPRAM HYDROBROMIDE 20 MG/1
20 TABLET ORAL DAILY
Qty: 90 TABLET | Refills: 3 | Status: SHIPPED | OUTPATIENT
Start: 2024-08-06

## 2024-08-06 SDOH — ECONOMIC STABILITY: FOOD INSECURITY: WITHIN THE PAST 12 MONTHS, YOU WORRIED THAT YOUR FOOD WOULD RUN OUT BEFORE YOU GOT MONEY TO BUY MORE.: NEVER TRUE

## 2024-08-06 SDOH — ECONOMIC STABILITY: INCOME INSECURITY: HOW HARD IS IT FOR YOU TO PAY FOR THE VERY BASICS LIKE FOOD, HOUSING, MEDICAL CARE, AND HEATING?: NOT HARD AT ALL

## 2024-08-06 SDOH — ECONOMIC STABILITY: FOOD INSECURITY: WITHIN THE PAST 12 MONTHS, THE FOOD YOU BOUGHT JUST DIDN'T LAST AND YOU DIDN'T HAVE MONEY TO GET MORE.: NEVER TRUE

## 2024-08-06 SDOH — ECONOMIC STABILITY: HOUSING INSECURITY
IN THE LAST 12 MONTHS, WAS THERE A TIME WHEN YOU DID NOT HAVE A STEADY PLACE TO SLEEP OR SLEPT IN A SHELTER (INCLUDING NOW)?: NO

## 2024-08-06 SDOH — ECONOMIC STABILITY: INCOME INSECURITY: IN THE LAST 12 MONTHS, WAS THERE A TIME WHEN YOU WERE NOT ABLE TO PAY THE MORTGAGE OR RENT ON TIME?: NO

## 2024-08-06 SDOH — ECONOMIC STABILITY: TRANSPORTATION INSECURITY
IN THE PAST 12 MONTHS, HAS LACK OF TRANSPORTATION KEPT YOU FROM MEETINGS, WORK, OR FROM GETTING THINGS NEEDED FOR DAILY LIVING?: NO

## 2024-08-06 SDOH — ECONOMIC STABILITY: TRANSPORTATION INSECURITY
IN THE PAST 12 MONTHS, HAS THE LACK OF TRANSPORTATION KEPT YOU FROM MEDICAL APPOINTMENTS OR FROM GETTING MEDICATIONS?: NO

## 2024-08-06 SDOH — HEALTH STABILITY: MENTAL HEALTH
STRESS IS WHEN SOMEONE FEELS TENSE, NERVOUS, ANXIOUS, OR CAN'T SLEEP AT NIGHT BECAUSE THEIR MIND IS TROUBLED. HOW STRESSED ARE YOU?: RATHER MUCH

## 2024-08-06 SDOH — ECONOMIC STABILITY: HOUSING INSECURITY: IN THE LAST 12 MONTHS, HOW MANY PLACES HAVE YOU LIVED?: 2

## 2024-08-06 SDOH — HEALTH STABILITY: PHYSICAL HEALTH: ON AVERAGE, HOW MANY MINUTES DO YOU ENGAGE IN EXERCISE AT THIS LEVEL?: 10 MIN

## 2024-08-06 SDOH — HEALTH STABILITY: PHYSICAL HEALTH: ON AVERAGE, HOW MANY DAYS PER WEEK DO YOU ENGAGE IN MODERATE TO STRENUOUS EXERCISE (LIKE A BRISK WALK)?: 1 DAY

## 2024-08-06 SDOH — ECONOMIC STABILITY: TRANSPORTATION INSECURITY
IN THE PAST 12 MONTHS, HAS LACK OF RELIABLE TRANSPORTATION KEPT YOU FROM MEDICAL APPOINTMENTS, MEETINGS, WORK OR FROM GETTING THINGS NEEDED FOR DAILY LIVING?: NO

## 2024-08-06 ASSESSMENT — PATIENT HEALTH QUESTIONNAIRE - PHQ9
7. TROUBLE CONCENTRATING ON THINGS, SUCH AS READING THE NEWSPAPER OR WATCHING TELEVISION: NOT AT ALL
SUM OF ALL RESPONSES TO PHQ9 QUESTIONS 1 AND 2: 3
CLINICAL INTERPRETATION OF PHQ2 SCORE: 0
3. TROUBLE FALLING OR STAYING ASLEEP OR SLEEPING TOO MUCH: MORE THAN HALF THE DAYS
2. FEELING DOWN, DEPRESSED, IRRITABLE, OR HOPELESS: MORE THAN HALF THE DAYS
8. MOVING OR SPEAKING SO SLOWLY THAT OTHER PEOPLE COULD HAVE NOTICED. OR THE OPPOSITE, BEING SO FIGETY OR RESTLESS THAT YOU HAVE BEEN MOVING AROUND A LOT MORE THAN USUAL: NOT AT ALL
4. FEELING TIRED OR HAVING LITTLE ENERGY: SEVERAL DAYS
9. THOUGHTS THAT YOU WOULD BE BETTER OFF DEAD, OR OF HURTING YOURSELF: NOT AT ALL
SUM OF ALL RESPONSES TO PHQ QUESTIONS 1-9: 8
6. FEELING BAD ABOUT YOURSELF - OR THAT YOU ARE A FAILURE OR HAVE LET YOURSELF OR YOUR FAMILY DOWN: SEVERAL DAYS
5. POOR APPETITE OR OVEREATING: SEVERAL DAYS
1. LITTLE INTEREST OR PLEASURE IN DOING THINGS: SEVERAL DAYS

## 2024-08-06 ASSESSMENT — ENCOUNTER SYMPTOMS
FEVER: 0
WEIGHT LOSS: 0
HEADACHES: 0
DIARRHEA: 0
ABDOMINAL PAIN: 0
BLOOD IN STOOL: 0
DIZZINESS: 0
CONSTIPATION: 0
PALPITATIONS: 0
CHILLS: 0
VOMITING: 0
SHORTNESS OF BREATH: 0
WHEEZING: 0
NAUSEA: 0

## 2024-08-06 ASSESSMENT — SOCIAL DETERMINANTS OF HEALTH (SDOH)
HOW OFTEN DO YOU ATTENT MEETINGS OF THE CLUB OR ORGANIZATION YOU BELONG TO?: 1 TO 4 TIMES PER YEAR
IN A TYPICAL WEEK, HOW MANY TIMES DO YOU TALK ON THE PHONE WITH FAMILY, FRIENDS, OR NEIGHBORS?: MORE THAN THREE TIMES A WEEK
HOW MANY DRINKS CONTAINING ALCOHOL DO YOU HAVE ON A TYPICAL DAY WHEN YOU ARE DRINKING: 1 OR 2
HOW OFTEN DO YOU ATTEND CHURCH OR RELIGIOUS SERVICES?: MORE THAN 4 TIMES PER YEAR
IN THE PAST 12 MONTHS, HAS THE ELECTRIC, GAS, OIL, OR WATER COMPANY THREATENED TO SHUT OFF SERVICE IN YOUR HOME?: NO
WITHIN THE PAST 12 MONTHS, YOU WORRIED THAT YOUR FOOD WOULD RUN OUT BEFORE YOU GOT THE MONEY TO BUY MORE: NEVER TRUE
DO YOU BELONG TO ANY CLUBS OR ORGANIZATIONS SUCH AS CHURCH GROUPS UNIONS, FRATERNAL OR ATHLETIC GROUPS, OR SCHOOL GROUPS?: YES
IN A TYPICAL WEEK, HOW MANY TIMES DO YOU TALK ON THE PHONE WITH FAMILY, FRIENDS, OR NEIGHBORS?: MORE THAN THREE TIMES A WEEK
HOW OFTEN DO YOU GET TOGETHER WITH FRIENDS OR RELATIVES?: ONCE A WEEK
DO YOU BELONG TO ANY CLUBS OR ORGANIZATIONS SUCH AS CHURCH GROUPS UNIONS, FRATERNAL OR ATHLETIC GROUPS, OR SCHOOL GROUPS?: YES
HOW OFTEN DO YOU ATTEND CHURCH OR RELIGIOUS SERVICES?: MORE THAN 4 TIMES PER YEAR
HOW HARD IS IT FOR YOU TO PAY FOR THE VERY BASICS LIKE FOOD, HOUSING, MEDICAL CARE, AND HEATING?: NOT HARD AT ALL
HOW OFTEN DO YOU ATTENT MEETINGS OF THE CLUB OR ORGANIZATION YOU BELONG TO?: 1 TO 4 TIMES PER YEAR
HOW OFTEN DO YOU HAVE SIX OR MORE DRINKS ON ONE OCCASION: NEVER
HOW OFTEN DO YOU GET TOGETHER WITH FRIENDS OR RELATIVES?: ONCE A WEEK
HOW OFTEN DO YOU HAVE A DRINK CONTAINING ALCOHOL: MONTHLY OR LESS

## 2024-08-06 ASSESSMENT — LIFESTYLE VARIABLES
AUDIT-C TOTAL SCORE: 1
HOW MANY STANDARD DRINKS CONTAINING ALCOHOL DO YOU HAVE ON A TYPICAL DAY: 1 OR 2
HOW OFTEN DO YOU HAVE A DRINK CONTAINING ALCOHOL: MONTHLY OR LESS
HOW OFTEN DO YOU HAVE SIX OR MORE DRINKS ON ONE OCCASION: NEVER
SKIP TO QUESTIONS 9-10: 1

## 2024-08-06 NOTE — LETTER
[facility]  Aly Soto M.D.  [address] [facility] [address]  [place] [address]  Fax: [phone]   Authorization for Release/Disclosure of   Protected Health Information   Name: MALIK MORROW : 1995 SSN: [id number]   Address: [address] [place] [address] [address] [place] [address] Phone:    There are no phone numbers on file.   I authorize the entity listed below to release/disclose the PHI below to:   [facility]/Aly Soto M.D. and Aly Soto M.D.   Provider or Entity Name:  [facility]    Address   City, State, [facility]   Phone:      Fax:     Reason for request: continuity of care   Information to be released:    [  ] LAST COLONOSCOPY,  including any PATH REPORT and follow-up  [  ] LAST FIT/COLOGUARD RESULT [  ] LAST DEXA  [  ] LAST MAMMOGRAM  [xxx  ] LAST PAP  [  ] LAST LABS [  ] RETINA EXAM REPORT  [  ] IMMUNIZATION RECORDS  [  ] Release all info      [  ] Check here and initial the line next to each item to release ALL health information INCLUDING  _____ Care and treatment for drug and / or alcohol abuse  _____ HIV testing, infection status, or AIDS  _____ Genetic Testing    DATES OF SERVICE OR TIME PERIOD TO BE DISCLOSED: _____________  I understand and acknowledge that:  * This Authorization may be revoked at any time by you in writing, except if your health information has already been used or disclosed.  * Your health information that will be used or disclosed as a result of you signing this authorization could be re-disclosed by the recipient. If this occurs, your re-disclosed health information may no longer be protected by State or Federal laws.  * You may refuse to sign this Authorization. Your refusal will not affect your ability to obtain treatment.  * This Authorization becomes effective upon signing and will  on (date) __________.      If no date is indicated, this Authorization will  one (1) year from the signature date.    Name: Malik Morrow  Signature: Date:    8/6/2024     PLEASE FAX REQUESTED RECORDS BACK TO: [phone]

## 2024-08-06 NOTE — PROGRESS NOTES
Subjective:     CC:     HPI:   Lottie presents today with    Former patient of Glnedy Perez MD  [facility] mild asthma/ allergy, Depression/ anxiety, celiac disease ( tested 13 years ago), severe allergy / anaphylaxis   Specialist  Allergy- Angeline  [facility]     Verbal consent was acquired by the patient to use [employer] ambient listening note generation during this visit Yes   History of Present Illness  The patient is a 28-year-old female with a history of depression, anxiety, and celiac disease. She presents today to [facility].    She reports no current issues with her celiac disease. A positive blood test conducted 13 years ago confirmed her diagnosis of celiac disease. She was scheduled to consult a gastroenterologist, but her insurance did not cover it. Currently, she does not wish to consult with a gastroenterologist as her celiac disease is not well-managed. Her allergies, which exacerbate her stomach discomfort, are causing her distress. She is scheduled to see Dr. Angeline rachel. She denies experiencing fever, chills, weight loss, excessive fatigue, chest pain, nausea, vomiting, blood in stools, urinary issues, dizziness, or headache.    She has been struggling with depression for the past few months, which led to a period of cessation of her medications. However, she resumed her medications last week, which she uses as needed for certain stressors. Over the past two weeks, she has experienced low interest in activities, feelings of depression, sleep disturbances, fatigue, low energy, and poor appetite or overeating. She also reports feeling low about herself or as if she has let herself or her family down. She denies difficulty concentrating or thoughts of self-harm or suicide. Her anxiety and depression are intermittent, typically triggered by work and family stressors.    She is on albuterol for food allergies and seasonal allergies, under the care of Dr. Marcus. She has an EpiPen.  She experiences anaphylaxis when eating, with tongue swelling and a rash all over her body upon touch. She is on allergy injections and has an EpiPen.    She is under the care of Dr. Zuniga at [facility]. She recently had an HIV test, which was negative.    Supplemental Information  She had her adenoids removed. She had some serious back problems and then she would be incontinent afterwards. It went away after she cleaned the house. She was pulled on by a patient last summer. She is a nurse. Her back is doing better now. Her sister, a physical therapist, gave her exercises.    SOCIAL HISTORY  She is a registered nurse. She does not smoke or vape. She drinks alcohol socially, less than 6 drinks per week.    FAMILY HISTORY  Her maternal grandmother  of ovarian cancer. Her maternal grandfather had colon cancer. Her maternal uncle  of prostate cancer. Her paternal grandfather has diabetes and COPD. Her mother had skin cancer. Her father has high cholesterol and stage 2 kidney disease.    ALLERGIES  She is allergic to GLUTEN.    IMMUNIZATIONS  She had her Prolia COVID-19 vaccines available at her local pharmacy.      Problem   Mdd (Major Depressive Disorder)    This is a chronic intermittent issue typically assoicated with stressor such as work and family. Previously been well controlled on citalopram. Recently restarted citalopram in 2024.   PHQ:8     Anxiety   Celiac Disease   Reactive Airway Disease    Follows with allergist     Anaphylaxis       Health Maintenance:     ROS:  Review of Systems   Constitutional:  Negative for chills, fever, malaise/fatigue and weight loss.   HENT:  Negative for hearing loss.    Respiratory:  Negative for shortness of breath and wheezing.    Cardiovascular:  Negative for chest pain and palpitations.   Gastrointestinal:  Negative for abdominal pain, blood in stool, constipation, diarrhea, melena, nausea and vomiting.   Genitourinary:  Negative for frequency and  "urgency.   Skin:  Negative for rash.   Neurological:  Negative for dizziness and headaches.       Objective:     Exam:  /70 (BP Location: Left arm)   Pulse 83   Temp 36.2 °C (97.1 °F)   Ht 1.778 m (5' 10\")   Wt 91.7 kg (202 lb 2.6 oz)   SpO2 98%   BMI 29.01 kg/m²  Body mass index is 29.01 kg/m².    Physical Exam  Constitutional:       Appearance: Normal appearance.   Cardiovascular:      Heart sounds: No murmur heard.  Pulmonary:      Effort: Pulmonary effort is normal.      Breath sounds: Normal breath sounds. No wheezing.   Musculoskeletal:      Cervical back: Normal range of motion and neck supple.   Lymphadenopathy:      Cervical: No cervical adenopathy.   Neurological:      Mental Status: She is alert.         Labs:     Assessment & Plan:     28 y.o. female with the following -     1. Recurrent major depressive disorder, in partial remission (HCC)    - CBC WITHOUT DIFFERENTIAL; Future  - Lipid Profile; Future  - TSH WITH REFLEX TO FT4; Future  - HEMOGLOBIN A1C; Future  - Comp Metabolic Panel; Future    2. Anxiety    - CBC WITHOUT DIFFERENTIAL; Future  - Lipid Profile; Future  - TSH WITH REFLEX TO FT4; Future  - HEMOGLOBIN A1C; Future  - Comp Metabolic Panel; Future    3. Celiac disease  - she may have administrative ppw that needs to be signed/ FMLA    - CBC WITHOUT DIFFERENTIAL; Future  - Lipid Profile; Future  - TSH WITH REFLEX TO FT4; Future  - HEMOGLOBIN A1C; Future  - Comp Metabolic Panel; Future    4. Mild reactive airways disease, unspecified whether persistent    - CBC WITHOUT DIFFERENTIAL; Future  - Lipid Profile; Future  - TSH WITH REFLEX TO FT4; Future  - HEMOGLOBIN A1C; Future  - Comp Metabolic Panel; Future    5. Routine screening for STI (sexually transmitted infection)    - HIV AG/AB COMBO ASSAY SCREENING; Future    Assessment & Plan  1. Major depression.  This is a chronic and stable condition with a slight flare. However, she has resumed citalopram, which started about a week ago. " She denies any suicidal or homicidal ideation. She is still working a full-time job without any issues. Escitalopram was refilled with the only diagnosis. Citalopram 20 mg daily was sent to her pharmacy.    2. Anxiety.  This is a chronic, stable condition. Citalopram 20 mg was sent to her pharmacy.    3. Primary celiac disease.  This is a chronic and stable condition. She has been avoiding gluten as a diagnosis given 13 years ago. She has not had prior colonoscopy. She is currently following with Dr. Marcus/allergist.    4. Reactive airway disease.  She has multiple allergies and with cellulitis. She does have an EpiPen and is following with Dr. Hooper. She has albuterol as needed.    5. Routine screening for STI.  She was exposed to HIV last week. She will obtain a repeat HIV test in about 4 to 6 weeks.        No follow-ups on file.    Please note that this dictation was created using voice recognition software. I have made every reasonable attempt to correct obvious errors, but I expect that there are errors of grammar and possibly content that I did not discover before finalizing the note.

## 2024-11-18 ENCOUNTER — OFFICE VISIT (OUTPATIENT)
Dept: URGENT CARE | Facility: CLINIC | Age: 29
End: 2024-11-18
Payer: COMMERCIAL

## 2024-11-18 VITALS
RESPIRATION RATE: 20 BRPM | SYSTOLIC BLOOD PRESSURE: 124 MMHG | WEIGHT: 205 LBS | OXYGEN SATURATION: 98 % | HEIGHT: 70 IN | HEART RATE: 88 BPM | BODY MASS INDEX: 29.35 KG/M2 | TEMPERATURE: 97 F | DIASTOLIC BLOOD PRESSURE: 85 MMHG

## 2024-11-18 DIAGNOSIS — R11.0 NAUSEA: ICD-10-CM

## 2024-11-18 DIAGNOSIS — N76.4 LABIAL ABSCESS: ICD-10-CM

## 2024-11-18 PROCEDURE — 99213 OFFICE O/P EST LOW 20 MIN: CPT | Mod: 25 | Performed by: PHYSICIAN ASSISTANT

## 2024-11-18 PROCEDURE — 96372 THER/PROPH/DIAG INJ SC/IM: CPT | Performed by: PHYSICIAN ASSISTANT

## 2024-11-18 RX ORDER — DOXYCYCLINE HYCLATE 100 MG
100 TABLET ORAL 2 TIMES DAILY
Qty: 14 TABLET | Refills: 0 | Status: SHIPPED | OUTPATIENT
Start: 2024-11-18 | End: 2024-11-25

## 2024-11-18 RX ORDER — ONDANSETRON 4 MG/1
4 TABLET, ORALLY DISINTEGRATING ORAL EVERY 6 HOURS PRN
Qty: 20 TABLET | Refills: 0 | Status: SHIPPED | OUTPATIENT
Start: 2024-11-18

## 2024-11-18 NOTE — LETTER
November 18, 2024         Patient: Lottie Melgoza   YOB: 1995   Date of Visit: 11/18/2024           To Whom it May Concern:    Lottie Melgoza was seen in my clinic on 11/18/2024.  Please excuse her absence from work on 11/19/2024.        Sincerely,           Ara Burns P.A.-C.  Electronically Signed

## 2024-11-25 ASSESSMENT — ENCOUNTER SYMPTOMS
CHILLS: 0
FEVER: 0
VOMITING: 0
NAUSEA: 1

## 2024-11-25 NOTE — PROGRESS NOTES
Subjective     Debby Melgoza is a 29 y.o. female who presents with Vaginal Pain (Cyst 3 days)    HPI:  Lottie Melgoza is a 29 y.o. female who presents today for evaluation of a probable vaginal infection.  Says that she shaved her genital region few days ago and afterwards developed a small pimple or ingrown hair.  Has been rapidly worsening in size and discomfort and is making her nauseous.  She has been doing soaks with warm water but it is not helping.  There has been no discharge.  No fever.  Says that her lymph nodes are also inflamed.        Review of Systems   Constitutional:  Negative for chills and fever.   Gastrointestinal:  Positive for nausea. Negative for vomiting.   Genitourinary:         Vaginal pain/cyst       PMH:  has a past medical history of Anxiety, Celiac disease, and Depression.  MEDS:   Current Outpatient Medications:     doxycycline (VIBRAMYCIN) 100 MG Tab, Take 1 Tablet by mouth 2 times a day for 7 days., Disp: 14 Tablet, Rfl: 0    ondansetron (ZOFRAN ODT) 4 MG TABLET DISPERSIBLE, Take 1 Tablet by mouth every 6 hours as needed for Nausea/Vomiting., Disp: 20 Tablet, Rfl: 0    EPINEPHrine (EPIPEN INJ), Inject  as directed., Disp: , Rfl:     citalopram (CELEXA) 20 MG Tab, Take 1 Tablet by mouth every day., Disp: 90 Tablet, Rfl: 3    albuterol 108 (90 Base) MCG/ACT Aero Soln inhalation aerosol, Inhale 2 Puffs by mouth every 6 hours as needed for Shortness of Breath., Disp: 8.5 g, Rfl: 2    ondansetron (ZOFRAN) 4 MG Tab tablet, Take 1 Tablet by mouth every four hours as needed for Nausea/Vomiting. (Patient not taking: Reported on 11/18/2024), Disp: 20 Tablet, Rfl: 0  ALLERGIES:   Allergies   Allergen Reactions    Cantaloupe Extract Allergy Skin Test Anaphylaxis    Gluten Meal Nausea, Vomiting, Rash, Diarrhea and Palpitations    Kiwi Extract Anaphylaxis    Orange Oil Anaphylaxis    Cantaloupe Anaphylaxis    Latex Hives, Itching, Rash and Swelling    Orange Palpitations, Photosensitivity  "and Shortness of Breath    Pistachio Nut (Diagnostic) Itching, Rash and Swelling    Watermelon Flavor Hives and Rash     SURGHX:   Past Surgical History:   Procedure Laterality Date    ADENOIDECTOMY      KNEE ARTHROSCOPY       SOCHX:  reports that she has never smoked. She has never used smokeless tobacco. She reports current alcohol use. She reports that she does not use drugs.  FH: Family history was reviewed, no pertinent findings to report    Objective     /85   Pulse 88   Temp 36.1 °C (97 °F) (Temporal)   Resp 20   Ht 1.778 m (5' 10\")   Wt 93 kg (205 lb)   SpO2 98%   BMI 29.41 kg/m²      Physical Exam  Constitutional:       General: She is not in acute distress.     Appearance: She is not diaphoretic.   HENT:      Head: Normocephalic and atraumatic.      Right Ear: External ear normal.      Left Ear: External ear normal.   Eyes:      Conjunctiva/sclera: Conjunctivae normal.      Pupils: Pupils are equal, round, and reactive to light.   Pulmonary:      Effort: Pulmonary effort is normal. No respiratory distress.   Genitourinary:     Labia:         Right: Tenderness and lesion present.       Comments: There is a palpable/tender lump in the right labia with overlying erythema.  No current discharge or fluctuance.  There is right-sided inguinal reactive adenopathy as well.  Musculoskeletal:      Cervical back: Normal range of motion.   Lymphadenopathy:      Lower Body: Right inguinal adenopathy present.   Skin:     Findings: No rash.   Neurological:      Mental Status: She is alert and oriented to person, place, and time.   Psychiatric:         Mood and Affect: Mood and affect normal.         Cognition and Memory: Memory normal.         Judgment: Judgment normal.             Assessment & Plan     1. Labial abscess  - doxycycline (VIBRAMYCIN) 100 MG Tab; Take 1 Tablet by mouth 2 times a day for 7 days.  Dispense: 14 Tablet; Refill: 0  - cefTRIAXone (Rocephin) 500 mg in lidocaine (Xylocaine) 1 % 2 mL for " IM use    2. Nausea  - ondansetron (ZOFRAN ODT) 4 MG TABLET DISPERSIBLE; Take 1 Tablet by mouth every 6 hours as needed for Nausea/Vomiting.  Dispense: 20 Tablet; Refill: 0  Patient presenting for evaluation of what appears to be a labial abscess.  Do not feel as though it is amenable to I&D at this time.  Will initiate treatment with antibiotics.  She will continue to do soaks with warm water and Epsom salt or apply warm compresses to the area.  She may use OTC analgesics to help with pain.  If no significant improvement in symptoms after being on the antibiotics for 2 to 3 days she should return for reevaluation.  If symptoms continue to worsen I recommend that she go to the emergency department for higher level of care.          Differential Diagnosis, natural history, and supportive care discussed. Return to the Urgent Care or follow up with your PCP if symptoms fail to resolve, or for any new or worsening symptoms. Emergency room precautions discussed. Patient and/or family appears understanding of information.

## 2024-12-03 ENCOUNTER — EH NON-PROVIDER (OUTPATIENT)
Dept: OCCUPATIONAL MEDICINE | Facility: CLINIC | Age: 29
End: 2024-12-03

## 2024-12-03 DIAGNOSIS — Z02.89 ENCOUNTER FOR OCCUPATIONAL HEALTH ASSESSMENT: ICD-10-CM

## 2024-12-03 PROCEDURE — 94375 RESPIRATORY FLOW VOLUME LOOP: CPT | Performed by: PREVENTIVE MEDICINE

## 2024-12-03 PROCEDURE — 94010 BREATHING CAPACITY TEST: CPT | Performed by: PREVENTIVE MEDICINE

## 2025-01-01 ENCOUNTER — OFFICE VISIT (OUTPATIENT)
Dept: URGENT CARE | Facility: PHYSICIAN GROUP | Age: 30
End: 2025-01-01
Payer: COMMERCIAL

## 2025-01-01 VITALS
SYSTOLIC BLOOD PRESSURE: 122 MMHG | DIASTOLIC BLOOD PRESSURE: 70 MMHG | TEMPERATURE: 97.8 F | OXYGEN SATURATION: 98 % | HEART RATE: 76 BPM | RESPIRATION RATE: 16 BRPM | BODY MASS INDEX: 29.49 KG/M2 | HEIGHT: 70 IN | WEIGHT: 206 LBS

## 2025-01-01 DIAGNOSIS — R05.1 ACUTE COUGH: ICD-10-CM

## 2025-01-01 DIAGNOSIS — Z20.818 EXPOSURE TO STREPTOCOCCAL PHARYNGITIS: ICD-10-CM

## 2025-01-01 DIAGNOSIS — J02.9 PHARYNGITIS, UNSPECIFIED ETIOLOGY: ICD-10-CM

## 2025-01-01 DIAGNOSIS — R13.10 ODYNOPHAGIA: ICD-10-CM

## 2025-01-01 LAB
FLUAV RNA SPEC QL NAA+PROBE: NEGATIVE
FLUBV RNA SPEC QL NAA+PROBE: NEGATIVE
RSV RNA SPEC QL NAA+PROBE: NEGATIVE
S PYO DNA SPEC NAA+PROBE: NOT DETECTED
SARS-COV-2 RNA RESP QL NAA+PROBE: NEGATIVE

## 2025-01-01 PROCEDURE — 3078F DIAST BP <80 MM HG: CPT | Performed by: FAMILY MEDICINE

## 2025-01-01 PROCEDURE — 3074F SYST BP LT 130 MM HG: CPT | Performed by: FAMILY MEDICINE

## 2025-01-01 PROCEDURE — 99213 OFFICE O/P EST LOW 20 MIN: CPT | Performed by: FAMILY MEDICINE

## 2025-01-01 PROCEDURE — 0241U POCT CEPHEID COV-2, FLU A/B, RSV - PCR: CPT | Performed by: FAMILY MEDICINE

## 2025-01-01 PROCEDURE — 87651 STREP A DNA AMP PROBE: CPT | Performed by: FAMILY MEDICINE

## 2025-01-01 RX ORDER — METHYLPREDNISOLONE 4 MG/1
TABLET ORAL
Qty: 21 TABLET | Refills: 0 | Status: SHIPPED | OUTPATIENT
Start: 2025-01-01

## 2025-01-01 ASSESSMENT — ENCOUNTER SYMPTOMS
SORE THROAT: 1
NAUSEA: 0
SHORTNESS OF BREATH: 0
TROUBLE SWALLOWING: 1
FEVER: 1
MYALGIAS: 1
VOMITING: 0
COUGH: 1
CHILLS: 0

## 2025-01-01 NOTE — PATIENT INSTRUCTIONS
Pharyngitis    Pharyngitis is a sore throat (pharynx). This is when there is redness, pain, and swelling in your throat. Most of the time, this condition gets better on its own. In some cases, you may need medicine.  What are the causes?  An infection from a virus.  An infection from bacteria.  Allergies.  What increases the risk?  Being 5-24 years old.  Being in crowded environments. These include:  Daycares.  Schools.  Dormitories.  Living in a place with cold temperatures outside.  Having a weakened disease-fighting (immune) system.  What are the signs or symptoms?  Symptoms may vary depending on the cause. Common symptoms include:  Sore throat.  Tiredness (fatigue).  Low-grade fever.  Stuffy nose.  Cough.  Headache.  Other symptoms may include:  Glands in the neck (lymph nodes) that are swollen.  Skin rashes.  Film on the throat or tonsils. This can be caused by an infection from bacteria.  Vomiting.  Red, itchy eyes.  Loss of appetite.  Joint pain and muscle aches.  Tonsils that are temporarily bigger than usual (enlarged).  How is this treated?  Many times, treatment is not needed. This condition usually gets better in 3-4 days without treatment.  If the infection is caused by a bacteria, you may be need to take antibiotics.  Follow these instructions at home:  Medicines  Take over-the-counter and prescription medicines only as told by your doctor.  If you were prescribed an antibiotic medicine, take it as told by your doctor. Do not stop taking the antibiotic even if you start to feel better.  Use throat lozenges or sprays to soothe your throat as told by your doctor.  Children can get pharyngitis. Do not give your child aspirin.  Managing pain  To help with pain, try:  Sipping warm liquids, such as:  Broth.  Herbal tea.  Warm water.  Eating or drinking cold or frozen liquids, such as frozen ice pops.  Rinsing your mouth (gargle) with a salt water mixture 3-4 times a day or as needed.  To make salt water,  dissolve ½-1 tsp (3-6 g) of salt in 1 cup (237 mL) of warm water.  Do not swallow this mixture.  Sucking on hard candy or throat lozenges.  Putting a cool-mist humidifier in your bedroom at night to moisten the air.  Sitting in the bathroom with the door closed for 5-10 minutes while you run hot water in the shower.    General instructions    Do not smoke or use any products that contain nicotine or tobacco. If you need help quitting, ask your doctor.  Rest as told by your doctor.  Drink enough fluid to keep your pee (urine) pale yellow.  How is this prevented?  Wash your hands often for at least 20 seconds with soap and water. If soap and water are not available, use hand .  Do not touch your eyes, nose, or mouth with unwashed hands. Wash hands after touching these areas.  Do not share cups or eating utensils.  Avoid close contact with people who are sick.  Contact a doctor if:  You have large, tender lumps in your neck.  You have a rash.  You cough up green, yellow-brown, or bloody spit.  Get help right away if:  You have a stiff neck.  You drool or cannot swallow liquids.  You cannot drink or take medicines without vomiting.  You have very bad pain that does not go away with medicine.  You have problems breathing, and it is not from a stuffy nose.  You have new pain and swelling in your knees, ankles, wrists, or elbows.  These symptoms may be an emergency. Get help right away. Call your local emergency services (911 in the [place]  Do not wait to see if the symptoms will go away.  Do not drive yourself to the hospital.  Summary  Pharyngitis is a sore throat (pharynx). This is when there is redness, pain, and swelling in your throat.  Most of the time, pharyngitis gets better on its own. Sometimes, you may need medicine.  If you were prescribed an antibiotic medicine, take it as told by your doctor. Do not stop taking the antibiotic even if you start to feel better.  This information is not intended to  replace advice given to you by your health care provider. Make sure you discuss any questions you have with your health care provider.  Document Revised: 03/16/2022 Document Reviewed: 03/16/2022  [email or web address] Patient Education © 2023 [employer]  Cough, Adult  A cough helps to clear your throat and lungs. A cough may be a sign of an illness or another medical condition.  An acute cough may only last 2-3 weeks, while a chronic cough may last 8 or more weeks.  Many things can cause a cough. They include:  Germs (viruses or bacteria) that attack the airway.  Breathing in things that bother (irritate) your lungs.  Allergies.  Asthma.  Mucus that runs down the back of your throat (postnasal drip).  Smoking.  Acid backing up from the stomach into the tube that moves food from the mouth to the stomach (gastroesophageal reflux).  Some medicines.  Lung problems.  Other medical conditions, such as heart failure or a blood clot in the lung (pulmonary embolism).  Follow these instructions at home:  Medicines  Take over-the-counter and prescription medicines only as told by your doctor.  Talk with your doctor before you take medicines that stop a cough (cough suppressants).  Lifestyle    Do not smoke, and try not to be around smoke. Do not use any products that contain nicotine or tobacco, such as cigarettes, e-cigarettes, and chewing tobacco. If you need help quitting, ask your doctor.  Drink enough fluid to keep your pee (urine) pale yellow.  Avoid caffeine.  Do not drink alcohol if your doctor tells you not to drink.  General instructions    Watch for any changes in your cough. Tell your doctor about them.  Always cover your mouth when you cough.  Stay away from things that make you cough, such as perfume, candles, campfire smoke, or cleaning products.  If the air is dry, use a cool mist vaporizer or humidifier in your home.  If your cough is worse at night, try using extra pillows to raise your head up higher while you  sleep.  Rest as needed.  Keep all follow-up visits as told by your doctor. This is important.  Contact a doctor if:  You have new symptoms.  You cough up pus.  Your cough does not get better after 2-3 weeks, or your cough gets worse.  Cough medicine does not help your cough and you are not sleeping well.  You have pain that gets worse or pain that is not helped with medicine.  You have a fever.  You are losing weight and you do not know why.  You have night sweats.  Get help right away if:  You cough up blood.  You have trouble breathing.  Your heartbeat is very fast.  These symptoms may be an emergency. Do not wait to see if the symptoms will go away. Get medical help right away. Call your local emergency services (911 in the [place] Do not drive yourself to the hospital.  Summary  A cough helps to clear your throat and lungs. Many things can cause a cough.  Take over-the-counter and prescription medicines only as told by your doctor.  Always cover your mouth when you cough.  Contact a doctor if you have new symptoms or you have a cough that does not get better or gets worse.  This information is not intended to replace advice given to you by your health care provider. Make sure you discuss any questions you have with your health care provider.  Document Revised: 02/05/2021 Document Reviewed: 01/06/2020  Elsevier Patient Education © 2023 [employer]

## 2025-01-01 NOTE — LETTER
January 1, 2025         Patient: Lottie Melgoza   YOB: 1995   Date of Visit: 1/1/2025           To Whom it May Concern:    Lottie Melgoza was seen in my clinic on 1/1/2025. She may return to work on 1/4/2025.    If you have any questions or concerns, please don't hesitate to call.        Sincerely,           Shar Bolaños M.D.  Electronically Signed

## 2025-01-05 ENCOUNTER — OFFICE VISIT (OUTPATIENT)
Dept: URGENT CARE | Facility: PHYSICIAN GROUP | Age: 30
End: 2025-01-05
Payer: COMMERCIAL

## 2025-01-05 VITALS
BODY MASS INDEX: 29.49 KG/M2 | WEIGHT: 206 LBS | HEIGHT: 70 IN | SYSTOLIC BLOOD PRESSURE: 110 MMHG | DIASTOLIC BLOOD PRESSURE: 62 MMHG | TEMPERATURE: 97.5 F | RESPIRATION RATE: 16 BRPM | HEART RATE: 78 BPM | OXYGEN SATURATION: 99 %

## 2025-01-05 DIAGNOSIS — H66.002 ACUTE SUPPURATIVE OTITIS MEDIA OF LEFT EAR WITHOUT SPONTANEOUS RUPTURE OF TYMPANIC MEMBRANE, RECURRENCE NOT SPECIFIED: ICD-10-CM

## 2025-01-05 DIAGNOSIS — R05.1 ACUTE COUGH: ICD-10-CM

## 2025-01-05 PROCEDURE — 99214 OFFICE O/P EST MOD 30 MIN: CPT | Performed by: FAMILY MEDICINE

## 2025-01-05 PROCEDURE — 3078F DIAST BP <80 MM HG: CPT | Performed by: FAMILY MEDICINE

## 2025-01-05 PROCEDURE — 3074F SYST BP LT 130 MM HG: CPT | Performed by: FAMILY MEDICINE

## 2025-01-05 RX ORDER — CODEINE PHOSPHATE AND GUAIFENESIN 10; 100 MG/5ML; MG/5ML
5 SOLUTION ORAL EVERY 6 HOURS PRN
Qty: 90 ML | Refills: 0 | Status: SHIPPED | OUTPATIENT
Start: 2025-01-05 | End: 2025-01-12

## 2025-01-05 NOTE — PROGRESS NOTES
Subjective:      Chief Complaint   Patient presents with    Follow-Up     Follow up 1/1 sx getting worse, Lft ear pain, chest congestion               Otalgia - left   This is a new problem. The current episode started in the past 7 days. The problem occurs constantly. The problem has been unchanged. Associated symptoms include  fever, congestion and coughing. Pertinent negatives include no abdominal pain, chest pain,  headaches, joint swelling, myalgias, nausea, neck pain, rash or visual change. Nothing aggravates the symptoms. She has tried nothing for the symptoms.     Social History     Tobacco Use    Smoking status: Never    Smokeless tobacco: Never   Vaping Use    Vaping status: Never Used   Substance Use Topics    Alcohol use: Yes     Comment: SOC    Drug use: No         Current Outpatient Medications on File Prior to Visit   Medication Sig Dispense Refill    methylPREDNISolone (MEDROL DOSEPAK) 4 MG Tablet Therapy Pack Follow schedule on package instructions. 21 Tablet 0    ondansetron (ZOFRAN ODT) 4 MG TABLET DISPERSIBLE Take 1 Tablet by mouth every 6 hours as needed for Nausea/Vomiting. 20 Tablet 0    EPINEPHrine (EPIPEN INJ) Inject  as directed.      albuterol 108 (90 Base) MCG/ACT Aero Soln inhalation aerosol Inhale 2 Puffs by mouth every 6 hours as needed for Shortness of Breath. 8.5 g 2    citalopram (CELEXA) 20 MG Tab Take 1 Tablet by mouth every day. (Patient not taking: Reported on 1/5/2025) 90 Tablet 3    ondansetron (ZOFRAN) 4 MG Tab tablet Take 1 Tablet by mouth every four hours as needed for Nausea/Vomiting. (Patient not taking: Reported on 1/5/2025) 20 Tablet 0     No current facility-administered medications on file prior to visit.         Past Medical History:   Diagnosis Date    Anxiety     Celiac disease     Depression          Family History   Problem Relation Age of Onset    Cancer Mother         skin    Hyperlipidemia Father     Cancer Maternal Uncle         prostate    Ovarian Cancer  "Maternal Grandmother     Cancer Maternal Grandmother     Colorectal Cancer Maternal Grandfather     Cancer Maternal Grandfather     Lung Disease Paternal Grandfather     Diabetes Paternal Grandfather     Breast Cancer Neg Hx     Peritoneal Cancer Neg Hx     Tubal Cancer Neg Hx                   Objective:     /62   Pulse 78   Temp 36.4 °C (97.5 °F)   Resp 16   Ht 1.778 m (5' 10\")   Wt 93.4 kg (206 lb)   SpO2 99%     Physical Exam   Constitutional: Vital signs are normal.  No distress.   HENT:   Head: There is normal jaw occlusion.   Right Ear: External ear normal. Tympanic membrane is normal. No middle ear effusion.   Left Ear: External ear normal. Tympanic membrane is abnormal - erythematous and bulging. A middle ear effusion is present.   Nose:   No nasal discharge.   Mouth/Throat: Mucous membranes are moist. No oral lesions. Pharynx erythema present. No oropharyngeal exudate, pharynx swelling or pharynx petechiae.    Eyes: Conjunctivae and EOM are normal. Pupils are equal, round, and reactive to light. Right eye exhibits no discharge. Left eye exhibits no discharge.   Neck: Normal range of motion. Neck supple.    Cardiovascular: Normal rate and regular rhythm.  Pulses are palpable.    No murmur heard.  Pulmonary/Chest: Effort normal and breath sounds normal. There is normal air entry. No respiratory distress. no wheezes, rhonchi,  retraction.   Musculoskeletal:   no edema.   Neurological: A/O x 3.   CN 2-12 intact   Skin: Skin is warm. Capillary refill takes less than 3 seconds. No purpura and no rash noted. Patient is not diaphoretic. No jaundice or pallor.   Nursing note and vitals reviewed.              Assessment/Plan:     2. Acute suppurative otitis media of left ear without spontaneous rupture of tympanic membrane, recurrence not specified     - amoxicillin-clavulanate (AUGMENTIN) 875-125 MG Tab; Take 1 Tablet by mouth 2 times a day for 10 days.  Dispense: 20 Tablet; Refill: 0       - " guaifenesin-codeine (ROBITUSSIN AC) Solution oral solution; Take 5 mL by mouth every 6 hours as needed for Cough for up to 7 days.  Dispense: 90 mL; Refill: 0        Narcotic use report obtained with no indication of narcotic overuse or misuse and deemed necessary for treaatment. Sedation, dependence, and constipation precautions given. Avoid use while driving or operating heavy machinery.         Differential diagnosis, natural history, supportive care, and indications for immediate follow-up discussed. All questions answered. Patient agrees with the plan of care.     Follow-up as needed if symptoms worsen or fail to improve to PCP, Urgent care or Emergency Room.     I have personally reviewed prior external notes and test results pertinent to today's visit.  I have independently reviewed and interpreted all diagnostics ordered during this urgent care acute visit.

## 2025-01-05 NOTE — LETTER
[facility] [address] [address]     January 5, 2025    Patient: Lottie Melgoza   YOB: 1995   Date of Visit: 1/5/2025       To Whom It May Concern:    Lottie Melgoza was seen and treated in our department on 1/5/2025.         She may return to work on Wednesday.          Sincerely,     Rigo Robbins M.D.

## 2025-03-05 ENCOUNTER — OCCUPATIONAL MEDICINE (OUTPATIENT)
Dept: OCCUPATIONAL MEDICINE | Facility: CLINIC | Age: 30
End: 2025-03-05
Payer: COMMERCIAL

## 2025-03-05 VITALS
SYSTOLIC BLOOD PRESSURE: 128 MMHG | WEIGHT: 210 LBS | HEART RATE: 99 BPM | TEMPERATURE: 97.7 F | RESPIRATION RATE: 14 BRPM | HEIGHT: 70 IN | BODY MASS INDEX: 30.06 KG/M2 | OXYGEN SATURATION: 98 % | DIASTOLIC BLOOD PRESSURE: 78 MMHG

## 2025-03-05 DIAGNOSIS — F41.9 ANXIETY: ICD-10-CM

## 2025-03-05 PROCEDURE — 99214 OFFICE O/P EST MOD 30 MIN: CPT | Performed by: NURSE PRACTITIONER
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
## 2025-03-14 NOTE — Clinical Note
REFERRAL APPROVAL NOTICE         Sent on March 14, 2025                   Debby Melgoza  [address] [place] [address]                   Dear Ms. Melgoza,    After a careful review of the medical information and benefit coverage, [facility] has processed your referral. See below for additional details.    If applicable, you must be actively enrolled with your insurance for coverage of the authorized service. If you have any questions regarding your coverage, please contact your insurance directly.    REFERRAL INFORMATION   Referral #:  [id number]  Referred-To Provider    Referred-By Provider:  Behavioral Health    ABBY Wu BLAKE H      [address] [address]  [phone] [id number]  [facility] [id number] [phone]    Referral Start Date:  03/05/2025  Referral End Date:   03/05/2026             SCHEDULING  If you do not already have an appointment, please call [phone] to make an appointment.     MORE INFORMATION  If you do not already have a [employer] account, sign up at: [email or web address]  You can access your medical information, make appointments, see lab results, billing information, and more.  If you have questions regarding this referral, please contact  the [facility] Referrals department at:             [phone]. Monday - Friday 8:00AM - 5:00PM.     Sincerely,    [facility]

## 2025-03-19 ENCOUNTER — OCCUPATIONAL MEDICINE (OUTPATIENT)
Dept: OCCUPATIONAL MEDICINE | Facility: CLINIC | Age: 30
End: 2025-03-19
Payer: COMMERCIAL

## 2025-03-19 VITALS
HEART RATE: 94 BPM | BODY MASS INDEX: 30.64 KG/M2 | OXYGEN SATURATION: 98 % | HEIGHT: 70 IN | SYSTOLIC BLOOD PRESSURE: 110 MMHG | RESPIRATION RATE: 16 BRPM | WEIGHT: 214 LBS | TEMPERATURE: 98 F | DIASTOLIC BLOOD PRESSURE: 72 MMHG

## 2025-03-19 DIAGNOSIS — F41.9 ANXIETY: ICD-10-CM

## 2025-03-19 PROCEDURE — 3074F SYST BP LT 130 MM HG: CPT | Performed by: NURSE PRACTITIONER

## 2025-03-19 PROCEDURE — 3078F DIAST BP <80 MM HG: CPT | Performed by: NURSE PRACTITIONER

## 2025-03-19 PROCEDURE — 99213 OFFICE O/P EST LOW 20 MIN: CPT | Performed by: NURSE PRACTITIONER

## 2025-03-19 ASSESSMENT — ENCOUNTER SYMPTOMS
CONSTITUTIONAL NEGATIVE: 1
NEUROLOGICAL NEGATIVE: 1
INSOMNIA: 0
RESPIRATORY NEGATIVE: 1
CARDIOVASCULAR NEGATIVE: 1
MUSCULOSKELETAL NEGATIVE: 1
NERVOUS/ANXIOUS: 1
DEPRESSION: 0
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
## 2025-04-08 ENCOUNTER — HOSPITAL ENCOUNTER (OUTPATIENT)
Dept: LAB | Facility: MEDICAL CENTER | Age: 30
End: 2025-04-08
Attending: STUDENT IN AN ORGANIZED HEALTH CARE EDUCATION/TRAINING PROGRAM
Payer: COMMERCIAL

## 2025-04-08 DIAGNOSIS — Z11.3 ROUTINE SCREENING FOR STI (SEXUALLY TRANSMITTED INFECTION): ICD-10-CM

## 2025-04-08 DIAGNOSIS — K90.0 CELIAC DISEASE: ICD-10-CM

## 2025-04-08 DIAGNOSIS — F41.9 ANXIETY: ICD-10-CM

## 2025-04-08 DIAGNOSIS — F33.41 RECURRENT MAJOR DEPRESSIVE DISORDER, IN PARTIAL REMISSION (HCC): ICD-10-CM

## 2025-04-08 DIAGNOSIS — J45.909 MILD REACTIVE AIRWAYS DISEASE, UNSPECIFIED WHETHER PERSISTENT: ICD-10-CM

## 2025-04-08 LAB
ALBUMIN SERPL BCP-MCNC: 4.5 G/DL (ref 3.2–4.9)
ALBUMIN/GLOB SERPL: 1.4 G/DL
ALP SERPL-CCNC: 76 U/L (ref 30–99)
ALT SERPL-CCNC: 19 U/L (ref 2–50)
ANION GAP SERPL CALC-SCNC: 11 MMOL/L (ref 7–16)
AST SERPL-CCNC: 20 U/L (ref 12–45)
BILIRUB SERPL-MCNC: 0.6 MG/DL (ref 0.1–1.5)
BUN SERPL-MCNC: 10 MG/DL (ref 8–22)
CALCIUM ALBUM COR SERPL-MCNC: 8.7 MG/DL (ref 8.5–10.5)
CALCIUM SERPL-MCNC: 9.1 MG/DL (ref 8.5–10.5)
CHLORIDE SERPL-SCNC: 105 MMOL/L (ref 96–112)
CHOLEST SERPL-MCNC: 193 MG/DL (ref 100–199)
CO2 SERPL-SCNC: 24 MMOL/L (ref 20–33)
CREAT SERPL-MCNC: 0.79 MG/DL (ref 0.5–1.4)
EST. AVERAGE GLUCOSE BLD GHB EST-MCNC: 117 MG/DL
GFR SERPLBLD CREATININE-BSD FMLA CKD-EPI: 102 ML/MIN/1.73 M 2
GLOBULIN SER CALC-MCNC: 3.3 G/DL (ref 1.9–3.5)
GLUCOSE SERPL-MCNC: 88 MG/DL (ref 65–99)
HBA1C MFR BLD: 5.7 % (ref 4–5.6)
HDLC SERPL-MCNC: 51 MG/DL
HIV 1+2 AB+HIV1 P24 AG SERPL QL IA: NORMAL
LDLC SERPL CALC-MCNC: 125 MG/DL
POTASSIUM SERPL-SCNC: 4.2 MMOL/L (ref 3.6–5.5)
PROT SERPL-MCNC: 7.8 G/DL (ref 6–8.2)
SODIUM SERPL-SCNC: 140 MMOL/L (ref 135–145)
TRIGL SERPL-MCNC: 83 MG/DL (ref 0–149)
TSH SERPL DL<=0.005 MIU/L-ACNC: 1.45 UIU/ML (ref 0.38–5.33)

## 2025-04-08 PROCEDURE — 36415 COLL VENOUS BLD VENIPUNCTURE: CPT

## 2025-04-08 PROCEDURE — 80061 LIPID PANEL: CPT

## 2025-04-08 PROCEDURE — 87389 HIV-1 AG W/HIV-1&-2 AB AG IA: CPT

## 2025-04-08 PROCEDURE — 83036 HEMOGLOBIN GLYCOSYLATED A1C: CPT

## 2025-04-08 PROCEDURE — 80053 COMPREHEN METABOLIC PANEL: CPT

## 2025-04-08 PROCEDURE — 84443 ASSAY THYROID STIM HORMONE: CPT

## 2025-04-10 ENCOUNTER — RESULTS FOLLOW-UP (OUTPATIENT)
Dept: MEDICAL GROUP | Facility: MEDICAL CENTER | Age: 30
End: 2025-04-10

## 2025-04-15 ENCOUNTER — TELEPHONE (OUTPATIENT)
Dept: OCCUPATIONAL MEDICINE | Facility: CLINIC | Age: 30
End: 2025-04-15
Payer: COMMERCIAL

## 2025-04-16 ENCOUNTER — OCCUPATIONAL MEDICINE (OUTPATIENT)
Dept: OCCUPATIONAL MEDICINE | Facility: CLINIC | Age: 30
End: 2025-04-16
Payer: COMMERCIAL

## 2025-04-16 VITALS
WEIGHT: 212 LBS | TEMPERATURE: 96.9 F | BODY MASS INDEX: 31.4 KG/M2 | RESPIRATION RATE: 16 BRPM | HEIGHT: 69 IN | OXYGEN SATURATION: 96 % | SYSTOLIC BLOOD PRESSURE: 126 MMHG | DIASTOLIC BLOOD PRESSURE: 84 MMHG | HEART RATE: 80 BPM

## 2025-04-16 DIAGNOSIS — F41.9 ANXIETY: ICD-10-CM

## 2025-04-16 PROCEDURE — 99213 OFFICE O/P EST LOW 20 MIN: CPT | Performed by: NURSE PRACTITIONER

## 2025-04-16 ASSESSMENT — LIFESTYLE VARIABLES: SUBSTANCE_ABUSE: 0

## 2025-04-16 ASSESSMENT — ENCOUNTER SYMPTOMS
INSOMNIA: 1
TINGLING: 0
CONSTITUTIONAL NEGATIVE: 1
NEUROLOGICAL NEGATIVE: 1
MEMORY LOSS: 0
CARDIOVASCULAR NEGATIVE: 1
RESPIRATORY NEGATIVE: 1
FOCAL WEAKNESS: 0
SENSORY CHANGE: 0
DEPRESSION: 1
NERVOUS/ANXIOUS: 1
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
## 2025-05-13 ENCOUNTER — TELEPHONE (OUTPATIENT)
Dept: OCCUPATIONAL MEDICINE | Facility: CLINIC | Age: 30
End: 2025-05-13
Payer: COMMERCIAL

## 2025-05-14 ENCOUNTER — OCCUPATIONAL MEDICINE (OUTPATIENT)
Dept: OCCUPATIONAL MEDICINE | Facility: CLINIC | Age: 30
End: 2025-05-14
Payer: COMMERCIAL

## 2025-05-14 VITALS
OXYGEN SATURATION: 96 % | HEIGHT: 70 IN | RESPIRATION RATE: 14 BRPM | HEART RATE: 85 BPM | DIASTOLIC BLOOD PRESSURE: 82 MMHG | BODY MASS INDEX: 30.49 KG/M2 | SYSTOLIC BLOOD PRESSURE: 124 MMHG | TEMPERATURE: 96.7 F | WEIGHT: 213 LBS

## 2025-05-14 DIAGNOSIS — F41.9 ANXIETY: Primary | ICD-10-CM

## 2025-05-14 PROCEDURE — 99213 OFFICE O/P EST LOW 20 MIN: CPT | Performed by: NURSE PRACTITIONER

## 2025-05-14 ASSESSMENT — ENCOUNTER SYMPTOMS
NERVOUS/ANXIOUS: 1
INSOMNIA: 1
DEPRESSION: 0
CARDIOVASCULAR NEGATIVE: 1
MEMORY LOSS: 0
CONSTITUTIONAL NEGATIVE: 1
MUSCULOSKELETAL NEGATIVE: 1
RESPIRATORY NEGATIVE: 1

## 2025-05-14 ASSESSMENT — LIFESTYLE VARIABLES: SUBSTANCE_ABUSE: 0
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
## 2025-06-17 ENCOUNTER — TELEPHONE (OUTPATIENT)
Dept: OCCUPATIONAL MEDICINE | Facility: CLINIC | Age: 30
End: 2025-06-17
Payer: COMMERCIAL

## 2025-06-18 ENCOUNTER — OCCUPATIONAL MEDICINE (OUTPATIENT)
Dept: OCCUPATIONAL MEDICINE | Facility: CLINIC | Age: 30
End: 2025-06-18
Payer: COMMERCIAL

## 2025-06-18 VITALS
WEIGHT: 213 LBS | RESPIRATION RATE: 16 BRPM | HEIGHT: 70 IN | TEMPERATURE: 97.5 F | SYSTOLIC BLOOD PRESSURE: 120 MMHG | DIASTOLIC BLOOD PRESSURE: 80 MMHG | OXYGEN SATURATION: 98 % | HEART RATE: 105 BPM | BODY MASS INDEX: 30.49 KG/M2

## 2025-06-18 DIAGNOSIS — F41.9 ANXIETY: Primary | ICD-10-CM

## 2025-06-18 PROCEDURE — 99213 OFFICE O/P EST LOW 20 MIN: CPT | Performed by: NURSE PRACTITIONER

## 2025-06-18 ASSESSMENT — LIFESTYLE VARIABLES: SUBSTANCE_ABUSE: 0

## 2025-06-18 ASSESSMENT — ENCOUNTER SYMPTOMS
DEPRESSION: 0
CARDIOVASCULAR NEGATIVE: 1
CONSTITUTIONAL NEGATIVE: 1
RESPIRATORY NEGATIVE: 1
MUSCULOSKELETAL NEGATIVE: 1
NERVOUS/ANXIOUS: 0
PALPITATIONS: 0
NEUROLOGICAL NEGATIVE: 1
INSOMNIA: 0
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
## 2025-06-26 NOTE — Clinical Note
REFERRAL APPROVAL NOTICE         Sent on June 26, 2025                   Debby Melgoza  [address]   [place] [address]                   Dear Ms. Melgoza,    After a careful review of the medical information and benefit coverage, [facility] has processed your referral. See below for additional details.    If applicable, you must be actively enrolled with your insurance for coverage of the authorized service. If you have any questions regarding your coverage, please contact your insurance directly.    REFERRAL INFORMATION   Referral #:  [id number]  Referred-To Department    Referred-By Provider:  Maternal Fetal Medicine    Marjorie Monterroso M.D.   Maternal Fetal Med      [address]  [facility] [address]  [phone] [address], [address]  [facility] [address] [phone]    Referral Start Date:  06/20/2025  Referral End Date:   04/16/2026             SCHEDULING  If you do not already have an appointment, please call [phone] to make an appointment.     MORE INFORMATION  If you do not already have a [employer] account, sign up at: [email or web address]  You can access your medical information, make appointments, see lab results, billing information, and more.  If you have questions regarding this referral, please contact  the [facility] Referrals department at:             [phone]. Monday - Friday 8:00AM - 5:00PM.     Sincerely,    [facility]

## 2025-07-02 ENCOUNTER — APPOINTMENT (OUTPATIENT)
Dept: MATERNAL FETAL MEDICINE | Facility: MEDICAL CENTER | Age: 30
End: 2025-07-02
Attending: OBSTETRICS & GYNECOLOGY
Payer: COMMERCIAL

## 2025-07-02 VITALS
WEIGHT: 212.7 LBS | BODY MASS INDEX: 30.52 KG/M2 | HEART RATE: 78 BPM | SYSTOLIC BLOOD PRESSURE: 99 MMHG | DIASTOLIC BLOOD PRESSURE: 69 MMHG

## 2025-07-02 DIAGNOSIS — Q87.40 MARFAN SYNDROME, UNSPECIFIED: ICD-10-CM

## 2025-07-02 PROCEDURE — 99203 OFFICE O/P NEW LOW 30 MIN: CPT | Performed by: OBSTETRICS & GYNECOLOGY

## 2025-07-02 PROCEDURE — 76801 OB US < 14 WKS SINGLE FETUS: CPT | Performed by: OBSTETRICS & GYNECOLOGY

## 2025-07-02 PROCEDURE — 76813 OB US NUCHAL MEAS 1 GEST: CPT | Performed by: OBSTETRICS & GYNECOLOGY

## 2025-07-14 ENCOUNTER — TELEPHONE (OUTPATIENT)
Dept: MATERNAL FETAL MEDICINE | Facility: MEDICAL CENTER | Age: 30
End: 2025-07-14
Payer: COMMERCIAL

## 2025-07-14 NOTE — TELEPHONE ENCOUNTER
Called patient to follow up if she has done the NIPT and carrier screening lab work we had given her last visit. Patient states she did it Saturday 7/12. I will double check our portal for results.

## 2025-07-21 ENCOUNTER — TELEPHONE (OUTPATIENT)
Dept: MATERNAL FETAL MEDICINE | Facility: MEDICAL CENTER | Age: 30
End: 2025-07-21
Payer: COMMERCIAL

## 2025-07-21 NOTE — TELEPHONE ENCOUNTER
LVM for patient stating we have NIPT results ready and carrier screening is still pending if she can give us a call back at her earliest convenience.

## 2025-07-21 NOTE — TELEPHONE ENCOUNTER
Patient called back in regards to NIPT. Let her know of low risk results with gender not reported, I also told her the carrier screening was still being processed so that would be a couple more days once we have results we would give her a call and let her know. Patient verbalized understanding no further questions at this time.

## 2025-08-22 ENCOUNTER — APPOINTMENT (OUTPATIENT)
Dept: MATERNAL FETAL MEDICINE | Facility: MEDICAL CENTER | Age: 30
End: 2025-08-22
Payer: COMMERCIAL